# Patient Record
Sex: FEMALE | Race: WHITE | NOT HISPANIC OR LATINO | URBAN - METROPOLITAN AREA
[De-identification: names, ages, dates, MRNs, and addresses within clinical notes are randomized per-mention and may not be internally consistent; named-entity substitution may affect disease eponyms.]

---

## 2023-03-17 LAB
EXTERNAL HIV SCREEN: NORMAL
HCV AB SER-ACNC: NEGATIVE

## 2023-07-17 ENCOUNTER — TELEPHONE (OUTPATIENT)
Dept: OBGYN CLINIC | Facility: OTHER | Age: 53
End: 2023-07-17

## 2023-07-17 ENCOUNTER — OFFICE VISIT (OUTPATIENT)
Age: 53
End: 2023-07-17
Payer: COMMERCIAL

## 2023-07-17 VITALS
WEIGHT: 129 LBS | HEIGHT: 63 IN | OXYGEN SATURATION: 98 % | SYSTOLIC BLOOD PRESSURE: 126 MMHG | RESPIRATION RATE: 16 BRPM | BODY MASS INDEX: 22.86 KG/M2 | DIASTOLIC BLOOD PRESSURE: 70 MMHG | TEMPERATURE: 96.8 F | HEART RATE: 85 BPM

## 2023-07-17 DIAGNOSIS — M65.321 TRIGGER INDEX FINGER OF RIGHT HAND: ICD-10-CM

## 2023-07-17 DIAGNOSIS — M19.049 HAND ARTHRITIS: Primary | ICD-10-CM

## 2023-07-17 PROCEDURE — G0382 LEV 3 HOSP TYPE B ED VISIT: HCPCS | Performed by: PHYSICIAN ASSISTANT

## 2023-07-17 PROCEDURE — 99283 EMERGENCY DEPT VISIT LOW MDM: CPT | Performed by: PHYSICIAN ASSISTANT

## 2023-07-17 RX ORDER — NAPROXEN 500 MG/1
500 TABLET ORAL 2 TIMES DAILY WITH MEALS
Qty: 60 TABLET | Refills: 0 | Status: SHIPPED | OUTPATIENT
Start: 2023-07-17

## 2023-07-17 RX ORDER — GABAPENTIN 100 MG/1
100 CAPSULE ORAL 3 TIMES DAILY
COMMUNITY
Start: 2023-03-06

## 2023-07-17 NOTE — TELEPHONE ENCOUNTER
Patient is being referred to a orthopedics. Please schedule accordingly.     [address] [employer]   [phone]

## 2023-07-17 NOTE — PROGRESS NOTES
[facility]        NAME: Vonnie Aggarwal is a 47 y.o. female  : 1970    MRN: [id number]  DATE: 2023  TIME: 10:14 AM    Assessment and Plan   Hand arthritis [M19.049]  1. Hand arthritis  Ambulatory Referral to Orthopedic Surgery    Ambulatory Referral to Physical Therapy    naproxen (EC NAPROSYN) 500 MG EC tablet      2. Trigger index finger of right hand  Ambulatory Referral to Orthopedic Surgery    Ambulatory Referral to Physical Therapy    naproxen (EC NAPROSYN) 500 MG EC tablet            Patient Instructions       Follow up with PCP in 3-5 days. Proceed to  ER if symptoms worsen. Chief Complaint     Chief Complaint   Patient presents with   • Hand Pain     Patient stated that she is unable to move her fingers. Stated that it started about 3 months ago. Finger zoey currently swollen and hurts. History of Present Illness       41-year-old female is presenting today with complaint of hand pain along the DIP joints and and right CMC joints, bilateral several months. The patient uses NSAIDs to help alleviate the pain on occasion. Pain is worse in the morning with stiffness which slightly improves by the afternoon. Denies trauma injury or surgery of the hands. She is also reports that her right index finger locks on occasion and pulls to open the finger on occasional mornings. Review of Systems   Review of Systems   Constitutional: Negative for activity change, appetite change, fatigue and fever. HENT: Negative for sore throat. Respiratory: Negative for cough. Gastrointestinal: Negative for nausea. Musculoskeletal: Positive for arthralgias and joint swelling. Negative for myalgias, neck pain and neck stiffness. Skin: Negative for color change. Neurological: Negative for dizziness, numbness and headaches.          Current Medications       Current Outpatient Medications:   •  gabapentin (NEURONTIN) 100 mg capsule, Take 100 mg by mouth Three times a day, Disp: , Rfl:   •  naproxen (EC NAPROSYN) 500 MG EC tablet, Take 1 tablet (500 mg total) by mouth 2 (two) times a day with meals, Disp: 60 tablet, Rfl: 0    Current Allergies     Allergies as of 07/17/2023 - Reviewed 07/17/2023   Allergen Reaction Noted   • Ciprofloxacin Other (See Comments) 09/04/2015   • Fluvastatin Throat Swelling 09/17/2020            The following portions of the patient's history were reviewed and updated as appropriate: allergies, current medications, past family history, past medical history, past social history, past surgical history and problem list.     History reviewed. No pertinent past medical history. History reviewed. No pertinent surgical history. History reviewed. No pertinent family history. Medications have been verified. Objective   /70   Pulse 85   Temp (!) 96.8 °F (36 °C)   Resp 16   Ht 5' 3" (1.6 m)   Wt 58.5 kg (129 lb)   SpO2 98%   BMI 22.85 kg/m²        Physical Exam     Physical Exam  Vitals and nursing note reviewed. Constitutional:       General: She is not in acute distress. Appearance: Normal appearance. She is not ill-appearing. Eyes:      Extraocular Movements: Extraocular movements intact. Conjunctiva/sclera: Conjunctivae normal.      Pupils: Pupils are equal, round, and reactive to light. Cardiovascular:      Rate and Rhythm: Normal rate and regular rhythm. Pulses: Normal pulses. Pulmonary:      Effort: Pulmonary effort is normal. No respiratory distress. Musculoskeletal:         General: Tenderness and deformity (Heberben's nodes) present. No swelling. Cervical back: Normal range of motion and neck supple. Skin:     General: Skin is warm. Findings: No bruising, erythema or lesion. Neurological:      General: No focal deficit present. Mental Status: She is alert and oriented to person, place, and time.       Coordination: Coordination normal.      Gait: Gait normal.   Psychiatric:         Mood and Affect: Mood normal.         Behavior: Behavior normal.         Thought Content:  Thought content normal.         Judgment: Judgment normal.

## 2023-07-17 NOTE — PATIENT INSTRUCTIONS
Trigger Finger   WHAT YOU NEED TO KNOW:   Trigger finger is when your finger or thumb gets stuck in a bent position and snaps, pops, or clicks when you straighten it. DISCHARGE INSTRUCTIONS:   Medicines:   NSAIDs:  These medicines decrease pain and swelling. NSAIDs can be bought without a doctor's order. Ask which medicine is right for you and how much to take. Take as directed. NSAIDs can cause stomach bleeding or kidney problems if not taken correctly. Take your medicine as directed. Contact your healthcare provider if you think your medicine is not helping or if you have side effects. Tell your provider if you are allergic to any medicine. Keep a list of the medicines, vitamins, and herbs you take. Include the amounts, and when and why you take them. Bring the list or the pill bottles to follow-up visits. Carry your medicine list with you in case of an emergency. Follow up with your healthcare provider or hand specialist as directed:  Write down your questions so you remember to ask them during your visits. Physical therapy  is used to teach you exercises to help improve movement and strength, and to decrease pain. Splint:  You may need to wear a splint for up to 6 weeks to keep your finger straight. This will help your finger joints rest and prevent you from bending your finger while you sleep. Contact your healthcare provider or hand specialist if:   Your symptoms do not go away or they return, even after treatment. The pain, swelling, or stiffness interferes with your daily activities. You have more trouble moving your finger. Your finger is tingling. You have questions or concerns about your condition or care. Return to the emergency department if:   You cannot move your finger at all. Your finger is numb. © Copyright [employer] 2022 Information is for End User's use only and may not be sold, redistributed or otherwise used for commercial purposes.   The above information is an  only. It is not intended as medical advice for individual conditions or treatments. Talk to your doctor, nurse or pharmacist before following any medical regimen to see if it is safe and effective for you. Arthritis   AMBULATORY CARE:   Arthritis  is pain or disease in one or more joints. There are many types of arthritis. Types such as rheumatoid arthritis cause inflammation in the joints. Other types wear away the cartilage between joints, such as osteoarthritis. This makes the bones of the joint rub together when you move the joint. An infection from bacteria, a virus, or a fungus can also cause arthritis. Your symptoms may be constant, or symptoms may come and go. Arthritis often gets worse over time and can cause permanent joint damage. Common signs and symptoms of arthritis:   Pain, swelling, or stiffness in the joint    Limited range of motion in the joint    Warmth or redness over the joint    Tenderness when you touch the joint    Stiff joints in the morning that loosen with movement    A creaking or grinding sound when you move the joint    Fever    Call your doctor or rheumatologist if:   You have a fever and severe joint pain or swelling. You cannot move the affected joint. You have severe joint pain you cannot tolerate. You have a new or worsening rash. Your pain or swelling does not get better with treatment. You have questions or concerns about your condition or care. Treatment  will depend on the type of arthritis you have and if it is severe. You may need any of the following:  Acetaminophen  decreases pain and fever. It is available without a doctor's order. Ask how much to take and how often to take it. Follow directions. Read the labels of all other medicines you are using to see if they also contain acetaminophen, or ask your doctor or pharmacist. Acetaminophen can cause liver damage if not taken correctly.     NSAIDs , such as ibuprofen, help decrease swelling, pain, and fever. This medicine is available with or without a doctor's order. NSAIDs can cause stomach bleeding or kidney problems in certain people. If you take blood thinner medicine, always ask your healthcare provider if NSAIDs are safe for you. Always read the medicine label and follow directions. Steroid medicine  helps reduce swelling and pain. Prescription pain medicine  may be given. Ask your healthcare provider how to take this medicine safely. Some prescription pain medicines contain acetaminophen. Do not take other medicines that contain acetaminophen without talking to your healthcare provider. Too much acetaminophen may cause liver damage. Prescription pain medicine may cause constipation. Ask your healthcare provider how to prevent or treat constipation. Surgery  may be needed to repair or replace a damaged joint. Manage your symptoms:   Rest your painful joint so it can heal.  Your healthcare provider may recommend crutches or a walker if the affected joint is in a leg. Apply ice or heat to the joint. Both can help decrease swelling and pain. Ice may also help prevent tissue damage. Use an ice pack, or put crushed ice in a plastic bag. Cover it with a towel and place it on your joint for 15 to 20 minutes every hour or as directed. You can apply heat for 20 minutes every 2 hours. Heat treatment includes hot packs or heat lamps. Elevate your joint. Elevation helps reduce swelling and pain. Raise your joint above the level of your heart as often as you can. Prop your painful joint on pillows to keep it above your heart comfortably. Manage arthritis:   Talk to your healthcare providers about your arthritis medicines. Some medicines may only be needed when you have arthritis pain. You may need to take other medicines every day to prevent arthritis from getting worse. Your healthcare providers will help you understand all your medicines and when to take them.  It is important to take the medicines as directed, even if you start to feel better. You can continue to have joint damage and inflammation even if you do not feel it. Eat a variety of healthy foods. Healthy foods include fruits, vegetables, whole-grain breads, low-fat dairy products, beans, lean meats, and fish. Ask if you need to be on a special diet. A diet rich in calcium and vitamin D may decrease your risk of osteoporosis. Foods high in calcium include milk, cheese, broccoli, and tofu. Vitamin D may be found in meat, fish, fortified milk, cereal and bread. Ask if you need calcium or vitamin D supplements. Go to physical or occupational therapy as directed. A physical therapist can teach you exercises to improve flexibility and range of motion. You may also be shown non-weight-bearing exercises that are safe for your joints, such as swimming. Exercise can help keep your joints flexible and reduce pain. An occupational therapist can help you learn to do your daily activities when your joints are stiff or sore. Maintain a healthy weight. Extra weight puts increased pressure on your joints. Ask your healthcare provider what you should weigh. If you need to lose weight, he or she can help you create a weight loss program. Weight loss can help reduce pain and increase your ability to do your activities. Wear flat or low-heeled shoes. This will help decrease pain and reduce pressure on your ankle, knee, and hip joints. Do not smoke. Nicotine and other chemicals in cigarettes and cigars can damage your bones and joints. Ask your healthcare provider for information if you currently smoke and need help to quit. E-cigarettes or smokeless tobacco still contain nicotine. Talk to your healthcare provider before you use these products. Support devices:   Orthotic shoes or insoles  help support your feet when you walk. Crutches, a cane, or a walker  may help decrease your risk for falling.  They also decrease stress on affected joints. Devices to prevent falls  include raised toilet seats and bathtub bars to help you get up from sitting. Handrails can be placed in areas where you need balance and support. Devices to help with support and rest  include splints to wear on your hands and a firm pillow while you sleep. Use a pillow that is firm enough to support your neck and head. Follow up with your healthcare provider or rheumatologist as directed:  Write down your questions so you remember to ask them during your visits. © Copyright [facility] 2022 Information is for End User's use only and may not be sold, redistributed or otherwise used for commercial purposes. The above information is an  only. It is not intended as medical advice for individual conditions or treatments. Talk to your doctor, nurse or pharmacist before following any medical regimen to see if it is safe and effective for you.

## 2023-12-08 ENCOUNTER — APPOINTMENT (OUTPATIENT)
Age: 53
End: 2023-12-08
Payer: COMMERCIAL

## 2023-12-08 ENCOUNTER — OFFICE VISIT (OUTPATIENT)
Age: 53
End: 2023-12-08
Payer: COMMERCIAL

## 2023-12-08 VITALS
RESPIRATION RATE: 18 BRPM | WEIGHT: 137 LBS | OXYGEN SATURATION: 98 % | SYSTOLIC BLOOD PRESSURE: 122 MMHG | BODY MASS INDEX: 24.27 KG/M2 | HEIGHT: 63 IN | HEART RATE: 89 BPM | TEMPERATURE: 97.9 F | DIASTOLIC BLOOD PRESSURE: 81 MMHG

## 2023-12-08 DIAGNOSIS — M25.532 LEFT WRIST PAIN: ICD-10-CM

## 2023-12-08 DIAGNOSIS — M77.8 LEFT WRIST TENDONITIS: Primary | ICD-10-CM

## 2023-12-08 PROBLEM — M54.12 RADICULOPATHY, CERVICAL REGION: Status: ACTIVE | Noted: 2023-03-06

## 2023-12-08 PROBLEM — G56.03 BILATERAL CARPAL TUNNEL SYNDROME: Status: ACTIVE | Noted: 2023-03-06

## 2023-12-08 PROBLEM — K21.9 GASTROESOPHAGEAL REFLUX DISEASE WITHOUT ESOPHAGITIS: Status: ACTIVE | Noted: 2023-03-06

## 2023-12-08 PROBLEM — M51.369 DISC DEGENERATION, LUMBAR: Status: ACTIVE | Noted: 2020-09-18

## 2023-12-08 PROBLEM — N87.9 CERVICAL DYSPLASIA: Status: ACTIVE | Noted: 2018-10-12

## 2023-12-08 PROBLEM — F43.23 ADJUSTMENT REACTION WITH ANXIETY AND DEPRESSION: Status: ACTIVE | Noted: 2023-03-06

## 2023-12-08 PROBLEM — D25.0 SUBMUCOUS LEIOMYOMA OF UTERUS: Status: ACTIVE | Noted: 2018-11-13

## 2023-12-08 PROBLEM — M51.36 DISC DEGENERATION, LUMBAR: Status: ACTIVE | Noted: 2020-09-18

## 2023-12-08 PROBLEM — M43.17 SPONDYLOLISTHESIS OF LUMBOSACRAL REGION: Status: ACTIVE | Noted: 2020-09-29

## 2023-12-08 PROBLEM — J30.1 SEASONAL ALLERGIC RHINITIS DUE TO POLLEN: Status: ACTIVE | Noted: 2023-03-06

## 2023-12-08 PROBLEM — M54.50 LOWER BACK PAIN: Status: ACTIVE | Noted: 2020-09-18

## 2023-12-08 PROBLEM — Z87.410 HISTORY OF CERVICAL DYSPLASIA: Status: ACTIVE | Noted: 2020-12-08

## 2023-12-08 PROBLEM — M54.16 LUMBAR RADICULOPATHY: Status: ACTIVE | Noted: 2020-09-18

## 2023-12-08 PROCEDURE — G0382 LEV 3 HOSP TYPE B ED VISIT: HCPCS

## 2023-12-08 PROCEDURE — 73110 X-RAY EXAM OF WRIST: CPT

## 2023-12-08 PROCEDURE — 29125 APPL SHORT ARM SPLINT STATIC: CPT

## 2023-12-08 PROCEDURE — 99283 EMERGENCY DEPT VISIT LOW MDM: CPT

## 2023-12-08 RX ORDER — NAPROXEN 500 MG/1
500 TABLET ORAL 2 TIMES DAILY WITH MEALS
Qty: 14 TABLET | Refills: 0 | Status: SHIPPED | OUTPATIENT
Start: 2023-12-08 | End: 2023-12-15

## 2023-12-08 RX ORDER — FAMOTIDINE 20 MG/1
20 TABLET, FILM COATED ORAL 2 TIMES DAILY
COMMUNITY
Start: 2023-03-06 | End: 2024-03-05

## 2023-12-08 RX ORDER — FLUTICASONE PROPIONATE 50 MCG
SPRAY, SUSPENSION (ML) NASAL
COMMUNITY
Start: 2023-11-03

## 2023-12-08 NOTE — PROGRESS NOTES
[facility]        NAME: Erwin Davey is a 47 y.o. female  : 1970    MRN: [id number]  DATE: 2023  TIME: 12:30 PM    Assessment and Plan   Left wrist tendonitis [M77.8]  1. Left wrist tendonitis  naproxen (Naprosyn) 500 mg tablet    Ambulatory Referral to Orthopedic Surgery      2. Left wrist pain  XR wrist 3+ vw left        No acute fracture per provider read, will follow-up with final read by radiologist if findings are significant. Suspect tendonitis given clinical presentation. Wrist placed in pre-fabricated brace and naproxen as directed for pain. Offered patient physical therapy but patient declined. Referral placed to orthopedics for further management. Patient Instructions     Wear wrist brace during the day while active. Rest and elevate wrist often, applying heat/ice every 3-4 hours for 20 minutes at a time. Take naproxen as directed for next week. May take tylenol every 4-6 hours as needed for additional relief of symptoms. Follow-up with orthopedics if no improvement of symptoms after 1 week. Report to the ER if symptoms worsen. Chief Complaint     Chief Complaint   Patient presents with    Wrist Pain     Started 2 months ago, patient complains of pain and swelling of left wrist and thumb. History of Present Illness       47year old female presents for evaluation of left wrist pain that started 2 months ago. She denies any known injury but relates she works as a  and uses her wrist to carry trays. She also reports a history of trigger finger in her right hand that she was seen for in July that seems to be getting worse. She was prescribed Naproxen and referred to orthopedics at that time but she reports she's been busy and has not been able to see orthopedics. She reports the pain is constant and felt there was a lump present around her wrist. She has been taking Advil for pain with minimal improvement.     Arm Pain   The incident occurred more than 1 week ago. The incident occurred at home. The injury mechanism was repetitive motion. The pain is present in the left wrist. The quality of the pain is described as aching. The pain does not radiate. The pain is at a severity of 7/10. The pain is moderate. The pain has been Intermittent since the incident. Pertinent negatives include no chest pain, muscle weakness, numbness or tingling. The symptoms are aggravated by palpation and movement. She has tried ice, immobilization and NSAIDs for the symptoms. The treatment provided no relief. Review of Systems   Review of Systems   Constitutional:  Negative for activity change, appetite change, chills, fatigue and fever. Respiratory:  Negative for chest tightness and shortness of breath. Cardiovascular:  Negative for chest pain. Gastrointestinal:  Negative for abdominal pain. Musculoskeletal:  Negative for back pain and neck pain. Skin:  Negative for color change, pallor, rash and wound. Neurological:  Negative for dizziness, tingling, light-headedness, numbness and headaches.          Current Medications       Current Outpatient Medications:     fluticasone (FLONASE) 50 mcg/act nasal spray, SPRAY 2 SPRAYS INTO EACH NOSTRIL EVERY DAY, Disp: , Rfl:     gabapentin (NEURONTIN) 100 mg capsule, Take 100 mg by mouth Three times a day, Disp: , Rfl:     naproxen (Naprosyn) 500 mg tablet, Take 1 tablet (500 mg total) by mouth 2 (two) times a day with meals for 7 days, Disp: 14 tablet, Rfl: 0    famotidine (PEPCID) 20 mg tablet, Take 20 mg by mouth 2 (two) times a day (Patient not taking: Reported on 12/8/2023), Disp: , Rfl:     Current Allergies     Allergies as of 12/08/2023 - Reviewed 12/08/2023   Allergen Reaction Noted    Ciprofloxacin Other (See Comments) 09/04/2015    Fluvastatin Throat Swelling 09/17/2020            The following portions of the patient's history were reviewed and updated as appropriate: allergies, current medications, past family history, past medical history, past social history, past surgical history and problem list.     History reviewed. No pertinent past medical history. History reviewed. No pertinent surgical history. History reviewed. No pertinent family history. Medications have been verified. Objective   /81   Pulse 89   Temp 97.9 °F (36.6 °C)   Resp 18   Ht 5' 3" (1.6 m)   Wt 62.1 kg (137 lb)   SpO2 98%   BMI 24.27 kg/m²        Physical Exam     Physical Exam  Vitals and nursing note reviewed. Constitutional:       General: She is awake. Appearance: Normal appearance. She is well-developed and normal weight. Cardiovascular:      Rate and Rhythm: Normal rate and regular rhythm. Pulses: Normal pulses. Radial pulses are 2+ on the right side and 2+ on the left side. Pulmonary:      Effort: Pulmonary effort is normal.   Musculoskeletal:         General: Swelling, tenderness and signs of injury present. No deformity. Right wrist: Normal.      Left wrist: Swelling, tenderness and bony tenderness present. No deformity, effusion, lacerations, snuff box tenderness or crepitus. Decreased range of motion. Normal pulse. Arms:       Cervical back: Normal range of motion and neck supple. Skin:     General: Skin is warm and dry. Capillary Refill: Capillary refill takes less than 2 seconds. Findings: No abrasion, bruising, ecchymosis, rash or wound. Neurological:      General: No focal deficit present. Mental Status: She is alert. Psychiatric:         Mood and Affect: Mood normal.         Behavior: Behavior normal. Behavior is cooperative. Judgment: Judgment normal.       Orthopedic injury treatment    Date/Time: 12/8/2023 12:00 PM    Performed by: Darren Flowers [address] [place] [address] by: STUART Beavers    Patient Location:  Bedside  [address] Protocol:  Procedure performed by: (Michelle DUPREE MA)  Consent: Verbal consent obtained.   Risks and benefits: risks, benefits and alternatives were discussed  Consent given by: patient  Patient understanding: patient states understanding of the procedure being performed  Patient consent: the patient's understanding of the procedure matches consent given  Required items: required blood products, implants, devices, and special equipment available  Patient identity confirmed: verbally with patient    Injury location:  Wrist  Location details:  Left wrist  Injury type: Soft tissue  Neurovascular status: Neurovascularly intact    Distal perfusion: normal    Neurological function: normal    Range of motion: reduced    Local anesthesia used?: No    General anesthesia used?: No    Skeletal traction used?: No    Immobilization:  Brace  Splint type:  Wrist  Supplies used: pre-fabricated brace.   Neurovascular status: Neurovascularly intact    Distal perfusion: normal    Neurological function: normal    Range of motion: normal    Patient tolerance:  Patient tolerated the procedure well with no immediate complications

## 2023-12-08 NOTE — PATIENT INSTRUCTIONS
Health Maintenance Due   Topic Date Due   â¢ Hepatitis B Vaccine (1 of 3 - Risk 3-dose series) 12/29/1962   â¢ Medicare Wellness 65+  09/24/2020   â¢ Depression Screening  10/20/2020       Patient is due for topics as listed above but is not proceeding with Immunization(s) Hep B at this time. Wear wrist brace during the day while active. Rest and elevate wrist often, applying heat/ice every 3-4 hours for 20 minutes at a time. Take naproxen as directed for next week. May take tylenol every 4-6 hours as needed for additional relief of symptoms. Follow-up with orthopedics if no improvement of symptoms after 1 week. Report to the ER if symptoms worsen.

## 2024-01-23 ENCOUNTER — OFFICE VISIT (OUTPATIENT)
Dept: OBGYN CLINIC | Facility: HOSPITAL | Age: 54
End: 2024-01-23
Payer: COMMERCIAL

## 2024-01-23 VITALS
HEART RATE: 67 BPM | DIASTOLIC BLOOD PRESSURE: 81 MMHG | WEIGHT: 137.8 LBS | SYSTOLIC BLOOD PRESSURE: 127 MMHG | BODY MASS INDEX: 24.41 KG/M2 | HEIGHT: 63 IN

## 2024-01-23 DIAGNOSIS — M54.2 CERVICAL PAIN (NECK): Primary | ICD-10-CM

## 2024-01-23 DIAGNOSIS — M65.4 TENOSYNOVITIS, DE QUERVAIN: ICD-10-CM

## 2024-01-23 PROCEDURE — 20550 NJX 1 TENDON SHEATH/LIGAMENT: CPT | Performed by: ORTHOPAEDIC SURGERY

## 2024-01-23 PROCEDURE — 99204 OFFICE O/P NEW MOD 45 MIN: CPT | Performed by: ORTHOPAEDIC SURGERY

## 2024-01-23 RX ORDER — BETAMETHASONE SODIUM PHOSPHATE AND BETAMETHASONE ACETATE 3; 3 MG/ML; MG/ML
6 INJECTION, SUSPENSION INTRA-ARTICULAR; INTRALESIONAL; INTRAMUSCULAR; SOFT TISSUE
Status: COMPLETED | OUTPATIENT
Start: 2024-01-23 | End: 2024-01-23

## 2024-01-23 RX ORDER — LIDOCAINE HYDROCHLORIDE 20 MG/ML
1 INJECTION, SOLUTION EPIDURAL; INFILTRATION; INTRACAUDAL; PERINEURAL
Status: COMPLETED | OUTPATIENT
Start: 2024-01-23 | End: 2024-01-23

## 2024-01-23 RX ADMIN — BETAMETHASONE SODIUM PHOSPHATE AND BETAMETHASONE ACETATE 6 MG: 3; 3 INJECTION, SUSPENSION INTRA-ARTICULAR; INTRALESIONAL; INTRAMUSCULAR; SOFT TISSUE at 11:00

## 2024-01-23 RX ADMIN — LIDOCAINE HYDROCHLORIDE 1 ML: 20 INJECTION, SOLUTION EPIDURAL; INFILTRATION; INTRACAUDAL; PERINEURAL at 11:00

## 2024-01-23 NOTE — PROGRESS NOTES
Assessment:  1. Cervical pain (neck)  Ambulatory Referral to Chiropractic      2. Tenosynovitis, de Quervain, left  Ambulatory Referral to Orthopedic Surgery    Ambulatory Referral to PT/OT Hand Therapy    Thumb Cude comf/Cool    Ambulatory Referral to Hand Surgery          Plan:  The patient has an examination consistent with deQuervain's tenosynovitis of the left wrist.  I have discussed with the patient the pathophysiology of this diagnosis and reviewed how the examination correlates with this diagnosis.  Treatment options were discussed at length and after discussing these treatment options, the patient was provided with a comfort cool brace today and a referral to hand therapy.  I would like the patient seen by one of our hand specialist in 4 weeks.  Referral placed for Chiro for evaluation of the cervical spine.         The above stated was discussed in layman's terms and the patient expressed understanding.  All questions were answered to the patient's satisfaction.             Subjective:   Luke Garcia is a 53 y.o. female who presents today for evaluation of left wrist pain. The pain began a few month(s) ago and is not associated with an acute injury. The patient describes the pain as aching and dull in intensity,  intermittent in timing, and localizes the pain to the  left radial wrist.  The pain is worse with overuse and relieved by rest.  The pain is not associated with numbness and tingling.  The pain is not associated with constitutional symptoms. The patient is not awoken at night by the pain.    The patient also reports cervical pain today no injury         History reviewed. No pertinent past medical history.    History reviewed. No pertinent surgical history.    History reviewed. No pertinent family history.    Social History     Occupational History    Not on file   Tobacco Use    Smoking status: Unknown    Smokeless tobacco: Not on file   Substance and Sexual Activity    Alcohol use: Not on file     Drug use: Not on file    Sexual activity: Not on file         Current Outpatient Medications:     fluticasone (FLONASE) 50 mcg/act nasal spray, SPRAY 2 SPRAYS INTO EACH NOSTRIL EVERY DAY, Disp: , Rfl:     gabapentin (NEURONTIN) 100 mg capsule, Take 100 mg by mouth Three times a day, Disp: , Rfl:     famotidine (PEPCID) 20 mg tablet, Take 20 mg by mouth 2 (two) times a day (Patient not taking: Reported on 12/8/2023), Disp: , Rfl:     naproxen (Naprosyn) 500 mg tablet, Take 1 tablet (500 mg total) by mouth 2 (two) times a day with meals for 7 days, Disp: 14 tablet, Rfl: 0    Allergies   Allergen Reactions    Ciprofloxacin Other (See Comments)    Fluvastatin Throat Swelling         Objective:  Vitals:    01/23/24 1157   BP: 127/81   Pulse: 67       Review of Systems   Constitutional:  Negative for chills and fever.   HENT:  Negative for drooling and sneezing.    Eyes:  Negative for redness.   Respiratory:  Negative for cough and wheezing.    Gastrointestinal:  Negative for nausea and vomiting.   Musculoskeletal:         Please see ortho exam   Psychiatric/Behavioral:  Negative for behavioral problems. The patient is not nervous/anxious.        De Quervain's Tenosynovitis Exam:  left side    Positive tender to palpation over 1st dorsal extensor compartment   Positive palpable nodule  Positive crepitus over 1st dorsal extensor compartment   Positive Finkelstein's test    Positive pain with resisted abduction of the thumb     Physical Exam  Vitals reviewed.   Constitutional:       Appearance: She is well-developed.   Eyes:      Pupils: Pupils are equal, round, and reactive to light.   Pulmonary:      Effort: Pulmonary effort is normal.      Breath sounds: Normal breath sounds.   Abdominal:      General: Abdomen is flat. There is no distension.   Skin:     General: Skin is warm and dry.   Neurological:      Mental Status: She is alert and oriented to person, place, and time.   Psychiatric:         Behavior: Behavior  "normal.         Thought Content: Thought content normal.         Judgment: Judgment normal.           Diagnostics, reviewed and taken today if performed as documented:    The attending physician has personally reviewed the pertinent films in PACS and interpretation is as follows:  Left wrist x-rays demonstrates no fracture or dislocation    Procedures, if performed today:    Hand/upper extremity injection: L extensor compartment 1  Universal Protocol:  Consent: Verbal consent obtained.  Consent given by: patient  Patient understanding: patient states understanding of the procedure being performed  Site marked: the operative site was marked  Patient identity confirmed: verbally with patient  Supporting Documentation  Indications: pain   Procedure Details  Condition:de Quervain's tenosynovitis Site: L extensor compartment 1   Needle size: 25 G  Ultrasound guidance: no  Approach: radial  Medications administered: 6 mg betamethasone acetate-betamethasone sodium phosphate 6 (3-3) mg/mL; 1 mL lidocaine (PF) 2 %  Patient tolerance: patient tolerated the procedure well with no immediate complications  Dressing:  Sterile dressing applied               Scribe Attestation      I,:  Nikki Wright am acting as a scribe while in the presence of the attending physician.:       I,:  Carlos Plummer MD personally performed the services described in this documentation    as scribed in my presence.:                 Portions of the record may have been created with voice recognition software.  Occasional wrong word or \"sound a like\" substitutions may have occurred due to the inherent limitations of voice recognition software.  Read the chart carefully and recognize, using context, where substitutions have occurred.  "

## 2024-02-06 ENCOUNTER — OFFICE VISIT (OUTPATIENT)
Age: 54
End: 2024-02-06
Payer: COMMERCIAL

## 2024-02-06 VITALS
HEART RATE: 84 BPM | DIASTOLIC BLOOD PRESSURE: 74 MMHG | BODY MASS INDEX: 24.27 KG/M2 | SYSTOLIC BLOOD PRESSURE: 132 MMHG | WEIGHT: 137 LBS | OXYGEN SATURATION: 97 % | HEIGHT: 63 IN

## 2024-02-06 DIAGNOSIS — S16.1XXA CERVICAL STRAIN, ACUTE, INITIAL ENCOUNTER: ICD-10-CM

## 2024-02-06 DIAGNOSIS — G44.219 EPISODIC TENSION-TYPE HEADACHE, NOT INTRACTABLE: ICD-10-CM

## 2024-02-06 DIAGNOSIS — M99.02 SEGMENTAL DYSFUNCTION OF THORACIC REGION: Primary | ICD-10-CM

## 2024-02-06 PROCEDURE — 98940 CHIROPRACT MANJ 1-2 REGIONS: CPT | Performed by: CHIROPRACTOR

## 2024-02-06 PROCEDURE — 99203 OFFICE O/P NEW LOW 30 MIN: CPT | Performed by: CHIROPRACTOR

## 2024-02-06 PROCEDURE — 97110 THERAPEUTIC EXERCISES: CPT | Performed by: CHIROPRACTOR

## 2024-02-06 NOTE — PROGRESS NOTES
Initial date of service: 2/6/24    Diagnoses and all orders for this visit:    Segmental dysfunction of thoracic region    Cervical strain, acute, initial encounter  -     Ambulatory Referral to Chiropractic  -     Ambulatory referral to Spine & Pain Management; Future    Episodic tension-type headache, not intractable    No red flags, radiculopathy or neurologic deficit appreciated clinically. Pt's symptoms and exam findings consistent with mechanical neck/ubp secondary to repetitive st/sp injury of L lev scapula mm, exacerbated by postural/ergonomic stressors. Pt responded well to traction, stretches and manual mobilization of the affected spinal and myofascial jt dysfunction, with increased ROM; trial of conservative tx recommended consisting of stretching, ther-ex, graded mobilization/manipulation of the affected spinal/myofascial tissues, postural/ergonomic education, and take home stretches/exercises. If symptoms fail to improve with short trial of conservative care, appropriate imaging and referral will be coordinated.  Due to location of our offices being outside of her immediate radius as well as pain being present for near 3 months when pain transitions to chronic, pt has been referred to Spine and Pain for trigger point injection consult  Spent greater than 30 min c pt discussing hx, pe, ddx, tx options and reviewing notes/imaging    TREATMENT: 16614,79529  Fear avoidance behavior discussion, encouraged and reassured pt that natural course of condition is to improve over time with adherence to tx plan and home care strategies. Home care recommendations: avoid bed rest, walk (but avoid trails and uneven surfaces), gradual return to activity to tolerance (avoid anything that peripheralizes symptoms), ice 20 min on/off, watch for ice burn, call if symptoms peripheralize, worsen, or neurologic deficit progresses. Ther-ex: IASTM - discussed post procedure soreness and/or ecchymosis for up to 36 hrs, applied to  affected mm hypertonicities; wall jerel, axial retraction, upper trap stretch, lev scap stretch, SCM stretch, abdominal bracing; greater than 15 min spent performing above mentioned ther-ex to improve ROM/flexibility. Thoracic mobilization/manipulation: prone P-A mob, supine A-P manip; cervical mobilization/manipulation: traction, diversified supine graded mobilization    HPI:  Luke Garcia is a 53 y.o. female   Chief Complaint   Patient presents with    Neck Pain     Neck pain-9    Shoulder Pain     Left shoulder pain-9     Pt presents for eval and tx for left sided neck and ubp pain since shoveling snow winter 2024. Pt has tried massage and cupping with modest benefit. Pt waits tables and cleans houses. Pt has concurrent tendonitis in L wrist that has responded well to cortisone injection    Neck Pain   This is a new problem. The current episode started more than 1 month ago. The problem occurs daily. The problem has been waxing and waning. The pain is present in the left side, midline and occipital region. The quality of the pain is described as aching, burning, cramping and stabbing. Pain scale: 7-10/10. The symptoms are aggravated by bending, position, stress and twisting (palliative includes massage). Associated symptoms include headaches. Associated symptoms comments: suboccipital.   Shoulder Pain       History reviewed. No pertinent past medical history.   The following portions of the patient's history were reviewed and updated as appropriate: allergies, past family history, past medical history, past social history, past surgical history, and problem list.  Review of Systems   Musculoskeletal:  Positive for neck pain.   Neurological:  Positive for headaches.     Physical Exam  Eyes:      Extraocular Movements: Extraocular movements intact.   Neck:        Comments: Pnful and limited in [place], Lrot, Llf, Ext/Lrot  Cardiovascular:      Pulses: Normal pulses.   Musculoskeletal:      Cervical back: Pain with  movement and muscular tenderness present. Decreased range of motion.      Thoracic back: Spasms and tenderness present. Decreased range of motion.   Lymphadenopathy:      Cervical: No cervical adenopathy.   Neurological:      Mental Status: She is alert and oriented to person, place, and time.      Cranial Nerves: Cranial nerves 2-12 are intact.      Sensory: Sensation is intact.      Motor: Motor function is intact.      Coordination: Coordination is intact.      Gait: Gait is intact. Gait and tandem walk normal.      Deep Tendon Reflexes: Reflexes normal. Babinski sign absent on the right side. Babinski sign absent on the left side.      Reflex Scores:       Tricep reflexes are 2+ on the right side and 2+ on the left side.       Bicep reflexes are 2+ on the right side and 2+ on the left side.       Brachioradialis reflexes are 2+ on the right side and 2+ on the left side.  Psychiatric:         Mood and Affect: Mood and affect normal.       SOFT TISSUE ASSESSMENT: Hypertonicity and tenderness palpated L C5-T6 erector spinae, L upper trap, lev scap, SCM, rhomboid, suboccipitals JOINT RECTRICTIONS: C0/1, C5-T6 ORTHO: Rosemary unremarkable for centralization/peripheralization; max foraminal comp elicits local np R/L; shoulder depression elicits stiffness in R/L upper trap; brachial plexus tension test elicits no neural tension in R/L UE; cervical distraction relieves CC    Return in about 1 week (around 2/13/2024) for Next scheduled follow up.

## 2024-02-06 NOTE — Clinical Note
Desi/s neck pain did well with a focus on myofascial tx today and her wrist pain/swelling was resolved from the injection by the time I saw her; thanks!

## 2024-02-12 ENCOUNTER — PROCEDURE VISIT (OUTPATIENT)
Age: 54
End: 2024-02-12
Payer: COMMERCIAL

## 2024-02-12 VITALS
HEIGHT: 63 IN | HEART RATE: 79 BPM | BODY MASS INDEX: 24.27 KG/M2 | SYSTOLIC BLOOD PRESSURE: 153 MMHG | DIASTOLIC BLOOD PRESSURE: 84 MMHG | WEIGHT: 137 LBS

## 2024-02-12 DIAGNOSIS — M99.02 SEGMENTAL DYSFUNCTION OF THORACIC REGION: ICD-10-CM

## 2024-02-12 DIAGNOSIS — G44.219 EPISODIC TENSION-TYPE HEADACHE, NOT INTRACTABLE: ICD-10-CM

## 2024-02-12 DIAGNOSIS — S16.1XXA CERVICAL STRAIN, ACUTE, INITIAL ENCOUNTER: Primary | ICD-10-CM

## 2024-02-12 PROCEDURE — 98940 CHIROPRACT MANJ 1-2 REGIONS: CPT | Performed by: CHIROPRACTOR

## 2024-02-12 PROCEDURE — 97110 THERAPEUTIC EXERCISES: CPT | Performed by: CHIROPRACTOR

## 2024-02-13 NOTE — PROGRESS NOTES
Initial date of service: 2/6/24    Diagnoses and all orders for this visit:    Cervical strain, acute, initial encounter    Segmental dysfunction of thoracic region    Episodic tension-type headache, not intractable    Pt improved with reduced pain and increased ROM    TREATMENT: [id number]  Ther-ex: IASTM - discussed post procedure soreness and/or ecchymosis for up to 36 hrs, applied to affected mm hypertonicities; wall jerel, axial retraction, upper trap stretch, lev scap stretch, SCM stretch, abdominal bracing; greater than 15 min spent performing above mentioned ther-ex to improve ROM/flexibility. Thoracic mobilization/manipulation: prone P-A mob, supine A-P manip; cervical mobilization/manipulation: traction, diversified supine graded mobilization    HPI:  Luke Garcia is a 53 y.o. female   Chief Complaint   Patient presents with    Neck - Pain     Neck pain that radiates down left shoulder. Patient states felt good for 3 days now back .  Pain score 4        Pt presents for tx for left sided neck and ubp pain since shoveling snow winter 2024. Pt has tried massage and cupping with modest benefit. Pt waits tables and cleans houses. Pt has concurrent tendonitis in L wrist that has responded well to cortisone injection  2/12: Pt reports feeling and moving better since last tx; near resolution for a few days but symptoms flared    Neck Pain   This is a new problem. The current episode started more than 1 month ago. The problem occurs daily. The problem has been waxing and waning. The pain is present in the left side, midline and occipital region. The quality of the pain is described as aching, burning, cramping and stabbing. Pain scale: 1-7/10. The symptoms are aggravated by bending, position, stress and twisting (palliative includes massage). Associated symptoms include headaches. Associated symptoms comments: suboccipital.   Shoulder Pain       History reviewed. No pertinent past medical history.   The following  portions of the patient's history were reviewed and updated as appropriate: allergies, past family history, past medical history, past social history, past surgical history, and problem list.  Review of Systems   Musculoskeletal:  Positive for neck pain.   Neurological:  Positive for headaches.     Physical Exam  Neck:        Comments: Pnful and limited in [place], Lrot, Llf, Ext/Lrot  Musculoskeletal:      Cervical back: Pain with movement and muscular tenderness present. Decreased range of motion.      Thoracic back: Spasms and tenderness present. Decreased range of motion.   Lymphadenopathy:      Cervical: No cervical adenopathy.   Neurological:      Mental Status: She is alert and oriented to person, place, and time.      Gait: Gait is intact.   Psychiatric:         Mood and Affect: Mood and affect normal.       SOFT TISSUE ASSESSMENT: Hypertonicity and tenderness palpated L C5-T6 erector spinae, L upper trap, lev scap, SCM, rhomboid, suboccipitals JOINT RECTRICTIONS: C0/1, C5-T6   Return in about 1 week (around 2/19/2024) for Next scheduled follow up.

## 2024-03-04 ENCOUNTER — OFFICE VISIT (OUTPATIENT)
Age: 54
End: 2024-03-04
Payer: COMMERCIAL

## 2024-03-04 VITALS
BODY MASS INDEX: 24.27 KG/M2 | DIASTOLIC BLOOD PRESSURE: 91 MMHG | WEIGHT: 137 LBS | HEART RATE: 81 BPM | SYSTOLIC BLOOD PRESSURE: 136 MMHG | OXYGEN SATURATION: 100 % | TEMPERATURE: 97.2 F | RESPIRATION RATE: 18 BRPM

## 2024-03-04 DIAGNOSIS — H00.014 HORDEOLUM EXTERNUM OF LEFT UPPER EYELID: Primary | ICD-10-CM

## 2024-03-04 PROCEDURE — 99283 EMERGENCY DEPT VISIT LOW MDM: CPT | Performed by: PHYSICIAN ASSISTANT

## 2024-03-04 PROCEDURE — G0382 LEV 3 HOSP TYPE B ED VISIT: HCPCS | Performed by: PHYSICIAN ASSISTANT

## 2024-03-04 RX ORDER — ERYTHROMYCIN 5 MG/G
0.5 OINTMENT OPHTHALMIC EVERY 8 HOURS SCHEDULED
Qty: 3.5 G | Refills: 0 | Status: SHIPPED | OUTPATIENT
Start: 2024-03-04 | End: 2024-03-14

## 2024-03-04 RX ORDER — CEPHALEXIN 500 MG/1
500 CAPSULE ORAL EVERY 12 HOURS SCHEDULED
Qty: 14 CAPSULE | Refills: 0 | Status: SHIPPED | OUTPATIENT
Start: 2024-03-04 | End: 2024-03-11

## 2024-03-04 NOTE — PROGRESS NOTES
[facility]        NAME: Luke Garcia is a 53 y.o. female  : 1970    MRN: [id number]  DATE: 2024  TIME: 12:22 PM    Assessment and Plan   Hordeolum externum of left upper eyelid [H00.014]  1. Hordeolum externum of left upper eyelid  erythromycin (ILOTYCIN) ophthalmic ointment    cephalexin (KEFLEX) 500 mg capsule            Patient Instructions     Warm compresses locally every 8 hours for approximately 5 to 10 minutes  Erythromycin ophthalmic ointment as directed  Cephalexin 500 mg 1 capsule twice daily for 7 days  Follow-up with ophthalmologist if your symptoms worsen in the next 48 hours.  Proceed to  ER if symptoms worsen.    Chief Complaint     Chief Complaint   Patient presents with    Eye Pain     Pt states her left eye has been swelling over the last 5 days. Pain is radiating to her jawline         History of Present Illness       53-year-old female is presenting today with complaint of 48 to 72 hours for left upper eyelid inflammation swelling and pain.  The patient denies pinkeye, injury, trauma, to the left eye.  The patient does admit having Botox done approximately a week ago over the left eyebrow.  Patient denies discharge of the eye or visual acuity problems.  The patient reports she does not utilize contact lenses.        Review of Systems   Review of Systems   Constitutional:  Negative for activity change, appetite change and fever.   Eyes:  Negative for visual disturbance.   Respiratory:  Negative for cough.    Gastrointestinal:  Negative for abdominal pain, nausea and vomiting.   Musculoskeletal:  Negative for myalgias.   Neurological:  Negative for dizziness, weakness, light-headedness and headaches.         Current Medications       Current Outpatient Medications:     cephalexin (KEFLEX) 500 mg capsule, Take 1 capsule (500 mg total) by mouth every 12 (twelve) hours for 7 days, Disp: 14 capsule, Rfl: 0    erythromycin (ILOTYCIN) ophthalmic ointment, Administer 0.5  inches into the left eye every 8 (eight) hours for 10 days, Disp: 3.5 g, Rfl: 0    fluticasone (FLONASE) 50 mcg/act nasal spray, SPRAY 2 SPRAYS INTO EACH NOSTRIL EVERY DAY, Disp: , Rfl:     gabapentin (NEURONTIN) 100 mg capsule, Take 100 mg by mouth Three times a day, Disp: , Rfl:     famotidine (PEPCID) 20 mg tablet, Take 20 mg by mouth 2 (two) times a day (Patient not taking: Reported on 12/8/2023), Disp: , Rfl:     naproxen (Naprosyn) 500 mg tablet, Take 1 tablet (500 mg total) by mouth 2 (two) times a day with meals for 7 days (Patient not taking: Reported on 2/6/2024), Disp: 14 tablet, Rfl: 0    Current Allergies     Allergies as of 03/04/2024 - Reviewed 03/04/2024   Allergen Reaction Noted    Ciprofloxacin Other (See Comments) 09/04/2015    Fluvastatin Throat Swelling 09/17/2020            The following portions of the patient's history were reviewed and updated as appropriate: allergies, current medications, past family history, past medical history, past social history, past surgical history and problem list.     History reviewed. No pertinent past medical history.    History reviewed. No pertinent surgical history.    History reviewed. No pertinent family history.      Medications have been verified.        Objective   /91   Pulse 81   Temp (!) 97.2 °F (36.2 °C)   Resp 18   Wt 62.1 kg (137 lb)   SpO2 100%   BMI 24.27 kg/m²        Physical Exam     Physical Exam  Vitals and nursing note reviewed.   Constitutional:       General: She is not in acute distress.     Appearance: Normal appearance. She is not ill-appearing, toxic-appearing or diaphoretic.   HENT:      Nose: Nose normal.      Mouth/Throat:      Mouth: Mucous membranes are moist.      Pharynx: Oropharynx is clear.   Eyes:      Extraocular Movements: Extraocular movements intact.      Conjunctiva/sclera: Conjunctivae normal.      Pupils: Pupils are equal, round, and reactive to light.      Comments: Left periorbital region is slightly  swollen with erythema and slight tenderness on palpation.  There is a nodule that is approximately 4 mm localized to the medial aspect of the upper eyelid of the left eye.  There is no purulent discharge.    OD 20/30  OS 20/30  OU 20/30    W/O lenses   Cardiovascular:      Rate and Rhythm: Normal rate and regular rhythm.      Pulses: Normal pulses.   Pulmonary:      Effort: Pulmonary effort is normal. No respiratory distress.      Breath sounds: Normal breath sounds.   Musculoskeletal:         General: Normal range of motion.      Cervical back: Normal range of motion and neck supple.   Skin:     Capillary Refill: Capillary refill takes more than 3 seconds.   Neurological:      Mental Status: She is alert.      Coordination: Coordination normal.      Gait: Gait normal.   Psychiatric:         Mood and Affect: Mood normal.         Behavior: Behavior normal.

## 2024-03-04 NOTE — PATIENT INSTRUCTIONS
Stye   Warm compresses locally every 8 hours for approximately 5 to 10 minutes  Erythromycin ophthalmic ointment as directed  Cephalexin 500 mg 1 capsule twice daily for 7 days  Follow-up with ophthalmologist if your symptoms worsen in the next 48 hours.  Proceed to  ER if symptoms worsen.        WHAT YOU NEED TO KNOW:   A stye is a lump on the edge or inside of your upper or lower eyelid caused by an infection. A stye usually starts to get better within 1 week and is often gone within 2 weeks.       DISCHARGE INSTRUCTIONS:   Call your doctor if:   You have redness and discharge around your eye, and your eye pain is getting worse.    Your vision changes.    The stye has not gone away within 2 weeks.    The stye comes back within a short period of time after treatment.    You have questions or concerns about your condition or care.    Medicines:   Antibiotic medicine  is given as an ointment to put into your eye. It is used to fight an infection caused by bacteria. Use as directed.    Take your medicine as directed.  Contact your healthcare provider if you think your medicine is not helping or if you have side effects. Tell your provider if you are allergic to any medicine. Keep a list of the medicines, vitamins, and herbs you take. Include the amounts, and when and why you take them. Bring the list or the pill bottles to follow-up visits. Carry your medicine list with you in case of an emergency.    Manage a stye:   Use warm compresses, as directed.  This will help decrease swelling and pain. Wet a clean washcloth with warm water and place it on your eye for 10 to 15 minutes, 3 to 4 times each day, or as directed.    Keep your hands away from your eye.  This helps to prevent the spread of infection to other parts of the eye. Wash your hands often with soap and dry with a clean towel. Do not squeeze the stye.    Do not wear eye makeup while you have a stye.  Eye makeup may carry bacteria and cause another stye. Throw  away eye makeup and brushes used to apply the makeup. Use new eye makeup after the stye has gone away. Do not share eye makeup with others.    Prevent another stye:  Wash your face and clean your eyelashes every day. Remove eye makeup with makeup remover. This helps to remove eye makeup without heavy rubbing.  Follow up with your doctor as directed:  Write down your questions so you remember to ask them during your visits.  © Copyright [employer] 2023 Information is for End User's use only and may not be sold, redistributed or otherwise used for commercial purposes.  The above information is an  only. It is not intended as medical advice for individual conditions or treatments. Talk to your doctor, nurse or pharmacist before following any medical regimen to see if it is safe and effective for you.

## 2024-06-06 ENCOUNTER — APPOINTMENT (OUTPATIENT)
Age: 54
End: 2024-06-06
Payer: COMMERCIAL

## 2024-06-06 DIAGNOSIS — Z00.00 ROUTINE GENERAL MEDICAL EXAMINATION AT A HEALTH CARE FACILITY: ICD-10-CM

## 2024-06-06 DIAGNOSIS — Z13.6 SCREENING FOR CARDIOVASCULAR CONDITION: ICD-10-CM

## 2024-06-06 DIAGNOSIS — M13.0 POLYARTHROPATHY: ICD-10-CM

## 2024-06-06 DIAGNOSIS — M13.0 POLYARTHRITIS: ICD-10-CM

## 2024-06-06 PROCEDURE — 86618 LYME DISEASE ANTIBODY: CPT

## 2024-06-06 PROCEDURE — 73130 X-RAY EXAM OF HAND: CPT

## 2024-06-06 PROCEDURE — 86038 ANTINUCLEAR ANTIBODIES: CPT

## 2024-06-07 LAB
ANA SER QL IA: NEGATIVE
B BURGDOR IGG+IGM SER QL IA: NEGATIVE

## 2024-11-26 ENCOUNTER — OFFICE VISIT (OUTPATIENT)
Dept: OBGYN CLINIC | Facility: CLINIC | Age: 54
End: 2024-11-26
Payer: COMMERCIAL

## 2024-11-26 VITALS — WEIGHT: 135 LBS | HEIGHT: 63 IN | BODY MASS INDEX: 23.92 KG/M2

## 2024-11-26 DIAGNOSIS — M65.4 TENOSYNOVITIS, DE QUERVAIN: ICD-10-CM

## 2024-11-26 PROCEDURE — 99214 OFFICE O/P EST MOD 30 MIN: CPT | Performed by: STUDENT IN AN ORGANIZED HEALTH CARE EDUCATION/TRAINING PROGRAM

## 2024-11-26 PROCEDURE — 20550 NJX 1 TENDON SHEATH/LIGAMENT: CPT | Performed by: STUDENT IN AN ORGANIZED HEALTH CARE EDUCATION/TRAINING PROGRAM

## 2024-11-26 RX ADMIN — BETAMETHASONE SODIUM PHOSPHATE AND BETAMETHASONE ACETATE 6 MG: 3; 3 INJECTION, SUSPENSION INTRA-ARTICULAR; INTRALESIONAL; INTRAMUSCULAR; SOFT TISSUE at 13:30

## 2024-11-26 RX ADMIN — BUPIVACAINE HYDROCHLORIDE 1 ML: 2.5 INJECTION, SOLUTION INFILTRATION; PERINEURAL at 13:30

## 2024-11-26 NOTE — PROGRESS NOTES
ORTHOPAEDIC HAND, WRIST, AND ELBOW OFFICE  VISIT      ASSESSMENT/PLAN:      Diagnoses and all orders for this visit:    Tenosynovitis, de Quervain, left  -     Ambulatory Referral to Hand Surgery  -     Hand/upper extremity injection: L extensor compartment 1          53 y.o. female with left De Quervain's tenosynovitis  Diagnostics reviewed and physical exam performed.  Diagnosis, treatment options and associated risks were discussed with the patient including no treatment, nonsurgical treatment and potential for surgical intervention.  The patient was given the opportunity to ask questions regarding each.   Patient was offered, accepted, and received a cortisone injection of the left first dorsal compartment today. Patient tolerated procedure well with no immediate complications. Post-injection protocols and expectations were discussed with the patient.   Recommend use of her thumb spica brace at night, and comfort cool brace during the day as needed  Apply Voltaren gel to painful area up to 4 times a day  May use ice or heat as needed for pain relief  May take NSAIDs/Tylenol as needed for pain control  Follow up as needed      Follow Up:  PRN       To Do Next Visit:  Re-evaluation of current issue      Discussions:  De Quervain Tenosynovitis: The anatomy and physiology of de Quervain's tenosynovitis was discussed with the patient today in the office.  Edema and increased contact pressure within the first dorsal extensor compartment at the radial styloid can cause pain, crepitation, and limitation of function.  Treatment options include resting thumb spica splints to decrease edema, oral anti-inflammatory medications, home or formal therapy exercises, up to 2 steroid injections within the first dorsal extensor compartment, or surgical release.  While majority of patients do respond to conservative treatment, up to 20% may require surgical release.       Naldo Umaña MD  Attending, Orthopaedic Surgery  Hand,  Wrist, and Elbow Surgery  [facility] Orth[facility]    ______________________________________________________________________________________________    CHIEF COMPLAINT:  Chief Complaint   Patient presents with    Left Wrist - Pain     Patient has had pain for the last year she has been wearing a brace does not help. 6 months ago she has an injection which helped for about five months would like another injection today       SUBJECTIVE:  Patient is a 53 y.o. RHD female who presents today for evaluation and treatment of left wrist pain. She was previously seen and evaluated by Dr. Plummer on 1/23/2024 where an injection was provided to the first dorsal compartment which the patient states provided her significant pain relief. She is a  and notes carrying trays bothers her wrist/thumb pain. She denies distal paraesthesias, mechanical symptoms, previous wrist or thumb injury.      Occupation:      I have personally reviewed all the relevant PMH, PSH, SH, FH, Medications and allergies      PAST MEDICAL HISTORY:  History reviewed. No pertinent past medical history.    PAST SURGICAL HISTORY:  History reviewed. No pertinent surgical history.    FAMILY HISTORY:  History reviewed. No pertinent family history.    SOCIAL HISTORY:  Social History     Tobacco Use    Smoking status: Unknown       MEDICATIONS:    Current Outpatient Medications:     fluticasone (FLONASE) 50 mcg/act nasal spray, SPRAY 2 SPRAYS INTO EACH NOSTRIL EVERY DAY, Disp: , Rfl:     gabapentin (NEURONTIN) 100 mg capsule, Take 1 capsule (100 mg total) by mouth 3 (three) times a day, Disp: 90 capsule, Rfl: 0    famotidine (PEPCID) 20 mg tablet, Take 20 mg by mouth 2 (two) times a day (Patient not taking: Reported on 12/8/2023), Disp: , Rfl:     naproxen (Naprosyn) 500 mg tablet, Take 1 tablet (500 mg total) by mouth 2 (two) times a day with meals for 7 days (Patient not taking: Reported on 2/6/2024), Disp: 14 tablet, Rfl:  "0    ALLERGIES:  Allergies   Allergen Reactions    Ciprofloxacin Other (See Comments)    Fluvastatin Throat Swelling           REVIEW OF SYSTEMS:  Musculoskeletal:        As noted in HPI.   All other systems reviewed and are negative.    VITALS:  There were no vitals filed for this visit.    LABS:  HgA1c: No results found for: \"HGBA1C\"  BMP:   Lab Results   Component Value Date    CALCIUM 9.3 06/06/2024    K 4.2 06/06/2024    CO2 25 06/06/2024     06/06/2024    BUN 12 06/06/2024    CREATININE 0.56 (L) 06/06/2024       _____________________________________________________  PHYSICAL EXAMINATION:  General: Well developed and well nourished, alert & oriented x 3, appears comfortable  Psychiatric: Normal  HEENT: Normocephalic, Atraumatic Trachea Midline, No torticollis  Pulmonary: No audible wheezing or respiratory distress   Abdomen/GI: Non tender, non distended   Cardiovascular: No pitting edema, 2+ radial pulse   Skin: No masses, erythema, lacerations, fluctation, ulcerations  Neurovascular: Sensation Intact to the Median, Ulnar, Radial Nerve, Motor Intact to the Median, Ulnar, Radial Nerve, and Pulses Intact  Musculoskeletal: Normal, except as noted in detailed exam and in HPI.      MUSCULOSKELETAL EXAMINATION:  Left De Quervain's Exam:  Patient presents with no obvious anatomical deformity  Skin is warm and dry to touch with no signs of erythema, ecchymosis, or infection  No soft tissue swelling or effusion noted  Nontender thumb CMC joint or A1 pulley  Positive tender to palpation over 1st dorsal extensor compartment   Negative palpable nodule  Positive crepitus over 1st dorsal extensor compartment   Positive Finkelstein's test    Positive pain with resisted abduction of the thumb  Negative CMC Grind test  Opposition strength 5/5  Intrinsic strength 5/5.  AIN 5/5  Demonstrates normal wrist, elbow, and shoulder motion  Forearm compartments are soft and supple  2+ distal radial pulse with brisk capillary refill " "to the fingers  Radial, median, and ulnar motor and sensory distribution intact  Sensations light to touch intact distally      ___________________________________________________  STUDIES REVIEWED:  Xrays of the left hand were reviewed and independently interpreted in PACS by Dr. Umaña and demonstrate no acute osseous abnormalities          PROCEDURES PERFORMED:  Hand/upper extremity injection: L extensor compartment 1  Universal Protocol:  Consent: Verbal consent obtained.  Risks and benefits: risks, benefits and alternatives were discussed  Consent given by: patient  Time out: Immediately prior to procedure a \"time out\" was called to verify the correct patient, procedure, equipment, support staff and site/side marked as required.  Patient understanding: patient states understanding of the procedure being performed  Site marked: the operative site was marked  Patient identity confirmed: verbally with patient  Supporting Documentation  Indications: tendon swelling   Procedure Details  Condition:de Quervain's tenosynovitis Site: L extensor compartment 1   Preparation: Patient was prepped and draped in the usual sterile fashion  Needle size: 25 G  Approach: volar  Medications administered: 6 mg betamethasone acetate-betamethasone sodium phosphate 6 (3-3) mg/mL; 1 mL bupivacaine 0.25 %  Patient tolerance: patient tolerated the procedure well with no immediate complications  Dressing:  Sterile dressing applied              _____________________________________________________      Scribe Attestation      I,:  Lynnette Bishop am acting as a scribe while in the presence of the attending physician.:       I,:  Naldo Umaña MD personally performed the services described in this documentation    as scribed in my presence.:             "

## 2024-11-27 RX ORDER — BUPIVACAINE HYDROCHLORIDE 2.5 MG/ML
1 INJECTION, SOLUTION INFILTRATION; PERINEURAL
Status: COMPLETED | OUTPATIENT
Start: 2024-11-26 | End: 2024-11-26

## 2024-11-27 RX ORDER — BETAMETHASONE SODIUM PHOSPHATE AND BETAMETHASONE ACETATE 3; 3 MG/ML; MG/ML
6 INJECTION, SUSPENSION INTRA-ARTICULAR; INTRALESIONAL; INTRAMUSCULAR; SOFT TISSUE
Status: COMPLETED | OUTPATIENT
Start: 2024-11-26 | End: 2024-11-26

## 2024-12-19 ENCOUNTER — HOSPITAL ENCOUNTER (EMERGENCY)
Facility: HOSPITAL | Age: 54
Discharge: HOME | End: 2024-12-19
Attending: EMERGENCY MEDICINE
Payer: COMMERCIAL

## 2024-12-19 VITALS
TEMPERATURE: 98 F | DIASTOLIC BLOOD PRESSURE: 91 MMHG | BODY MASS INDEX: 23.63 KG/M2 | RESPIRATION RATE: 20 BRPM | OXYGEN SATURATION: 99 % | HEART RATE: 85 BPM | SYSTOLIC BLOOD PRESSURE: 164 MMHG | HEIGHT: 63 IN | WEIGHT: 133.38 LBS

## 2024-12-19 DIAGNOSIS — Z23 ENCOUNTER FOR ADMINISTRATION OF VACCINE: Primary | ICD-10-CM

## 2024-12-19 PROCEDURE — 90675 RABIES VACCINE IM: CPT | Performed by: EMERGENCY MEDICINE

## 2024-12-19 PROCEDURE — 90472 IMMUNIZATION ADMIN EACH ADD: CPT

## 2024-12-19 PROCEDURE — 90471 IMMUNIZATION ADMIN: CPT

## 2024-12-19 PROCEDURE — 99283 EMERGENCY DEPT VISIT LOW MDM: CPT | Performed by: EMERGENCY MEDICINE

## 2024-12-19 PROCEDURE — 96372 THER/PROPH/DIAG INJ SC/IM: CPT

## 2024-12-19 PROCEDURE — 90375 RABIES IG IM/SC: CPT | Performed by: EMERGENCY MEDICINE

## 2024-12-19 PROCEDURE — 99283 EMERGENCY DEPT VISIT LOW MDM: CPT

## 2024-12-19 RX ADMIN — RABIES VIRUS STRAIN PM-1503-3M ANTIGEN (PROPIOLACTONE INACTIVATED) AND WATER 1 ML: KIT at 19:18

## 2024-12-19 RX ADMIN — RABIES IMMUNE GLOBULIN (HUMAN) 1200 UNITS: 300 INJECTION, SOLUTION INFILTRATION; INTRAMUSCULAR at 19:16

## 2024-12-19 NOTE — ED PROVIDER NOTES
ED Disposition       None          Assessment & Plan       Medical Decision Making  Patient is a 54-year-old female past medical history of ankylosing spondylitis presenting for rabies vaccine.  Patient is well-appearing at bedside with stable vitals and in no acute distress.  She has no wounds and no complaints at this time.  Will give rabies vaccine and have advised that she return for full rabies series and will give rabies immunoglobulin.    Risk  Prescription drug management.             Medications   rabies vaccine, human diploid IM injection 1 mL (has no administration in time range)   rabies immune globulin, human (HyperRAB) injection 1,200 Units (has no administration in time range)       ED Risk Strat Scores                                              History of Present Illness       Chief Complaint   Patient presents with    Medical Problem     Patient told to come to ED for rabies series by TIM d/t her daughter's dog attacking a raccoon that tested positive for rabies and the dog later licked her.         Past Medical History:   Diagnosis Date    Personal history of ankylosing spondylitis       Past Surgical History:   Procedure Laterality Date    MANDIBLE FRACTURE SURGERY      SINUS SURGERY      TONSILLECTOMY        History reviewed. No pertinent family history.   Social History     Tobacco Use    Smoking status: Unknown   Vaping Use    Vaping status: Never Used   Substance Use Topics    Alcohol use: Never    Drug use: Never      E-Cigarette/Vaping    E-Cigarette Use Never User       E-Cigarette/Vaping Substances      I have reviewed and agree with the history as documented.     Patient is a 54-year-old female past medical history of ankylosing spondylitis presenting for rabies vaccine.  Patient states 3 days ago her dog attacked a raccoon and the raccoon's brain was sent for testing and was found to be positive for rabies.  She states the Department of Health contacted her and told him that the  entire household would have to be vaccinated against rabies.  She denies any wounds.        Review of Systems   All other systems reviewed and are negative.          Objective       ED Triage Vitals [12/19/24 1828]   Temperature Pulse Blood Pressure Respirations SpO2 Patient Position - Orthostatic VS   98 °F (36.7 °C) 97 (!) 182/103 18 100 % Sitting      Temp Source Heart Rate Source BP Location FiO2 (%) Pain Score    Temporal Monitor Left arm -- --      Vitals      Date and Time Temp Pulse SpO2 Resp BP Pain Score FACES Pain Rating User   12/19/24 1828 98 °F (36.7 °C) 97 100 % 18 182/103 -- -- RO            Physical Exam  Vitals reviewed.   Constitutional:       General: She is not in acute distress.     Appearance: Normal appearance. She is not ill-appearing.   HENT:      Mouth/Throat:      Mouth: Mucous membranes are moist.   Eyes:      Conjunctiva/sclera: Conjunctivae normal.   Cardiovascular:      Rate and Rhythm: Normal rate and regular rhythm.      Heart sounds: Normal heart sounds.   Pulmonary:      Effort: Pulmonary effort is normal.      Breath sounds: Normal breath sounds.   Abdominal:      General: Abdomen is flat.      Palpations: Abdomen is soft.      Tenderness: There is no abdominal tenderness.   Musculoskeletal:         General: No swelling. Normal range of motion.      Cervical back: Neck supple.   Skin:     General: Skin is warm and dry.   Neurological:      General: No focal deficit present.      Mental Status: She is alert.   Psychiatric:         Mood and Affect: Mood normal.         Results Reviewed       None            No orders to display       Procedures    ED Medication and Procedure Management   Prior to Admission Medications   Prescriptions Last Dose Informant Patient Reported? Taking?   famotidine (PEPCID) 20 mg tablet   Yes No   Sig: Take 20 mg by mouth 2 (two) times a day   Patient not taking: Reported on 12/8/2023   fluticasone (FLONASE) 50 mcg/act nasal spray   Yes No   Sig: SPRAY 2  SPRAYS INTO EACH NOSTRIL EVERY DAY   gabapentin (NEURONTIN) 100 mg capsule   No No   Sig: Take 1 capsule (100 mg total) by mouth 3 (three) times a day   naproxen (Naprosyn) 500 mg tablet   No No   Sig: Take 1 tablet (500 mg total) by mouth 2 (two) times a day with meals for 7 days   Patient not taking: Reported on 2/6/2024      Facility-Administered Medications: None     Patient's Medications   Discharge Prescriptions    No medications on file     No discharge procedures on file.  ED SEPSIS DOCUMENTATION            Sima Magana DO  12/19/24 1718

## 2024-12-23 ENCOUNTER — HOSPITAL ENCOUNTER (EMERGENCY)
Facility: HOSPITAL | Age: 54
Discharge: HOME | End: 2024-12-23
Attending: EMERGENCY MEDICINE
Payer: COMMERCIAL

## 2024-12-23 VITALS
DIASTOLIC BLOOD PRESSURE: 78 MMHG | RESPIRATION RATE: 18 BRPM | HEART RATE: 76 BPM | WEIGHT: 137.35 LBS | OXYGEN SATURATION: 100 % | BODY MASS INDEX: 24.33 KG/M2 | TEMPERATURE: 98.1 F | SYSTOLIC BLOOD PRESSURE: 179 MMHG

## 2024-12-23 DIAGNOSIS — Z23 ENCOUNTER FOR REPEAT ADMINISTRATION OF RABIES VACCINATION: Primary | ICD-10-CM

## 2024-12-23 PROCEDURE — 90675 RABIES VACCINE IM: CPT | Performed by: EMERGENCY MEDICINE

## 2024-12-23 PROCEDURE — 90471 IMMUNIZATION ADMIN: CPT

## 2024-12-23 PROCEDURE — 99282 EMERGENCY DEPT VISIT SF MDM: CPT | Performed by: EMERGENCY MEDICINE

## 2024-12-23 RX ADMIN — RABIES VIRUS STRAIN PM-1503-3M ANTIGEN (PROPIOLACTONE INACTIVATED) AND WATER 1 ML: KIT at 16:07

## 2024-12-23 NOTE — DISCHARGE INSTRUCTIONS
A  personal message from Dr. Gomez Mace,  Thank you so much for allowing me to care for you today.    I pride myself in the care and attention I give all my patients.  I hope you were a witness to this tonight.   If for any reason your condition does not improve or worsens, or you have a question that was not answered during your visit you can feel free to text me on my personal phone #  # [phone].   I will answer to your message and continue your care past your emergency room visit.     Please understand that although you are being discharged because your condition has been deemed stable and able to be managed on an outpatient setting. However your condition may worsen as part of the natural progression of the illness/condition, if this occurs please come back to the emergency department for a repeat evaluation.

## 2024-12-23 NOTE — ED PROVIDER NOTES
Time reflects when diagnosis was documented in both MDM as applicable and the Disposition within this note       Time User Action Codes Description Comment    12/23/2024  3:51 PM Gomez Mace Add [Z23] Encounter for repeat administration of rabies vaccination           ED Disposition       ED Disposition   Discharge    Condition   Stable    Date/Time   Mon Dec 23, 2024  3:51 PM    Comment   Luke Garcia discharge to home/self care.                   Assessment & Plan       Medical Decision Making  Problems Addressed:  Encounter for repeat administration of rabies vaccination: acute illness or injury    Risk  Prescription drug management.             Medications   rabies vaccine, human diploid IM injection 1 mL (1 mL Intramuscular Given 12/23/24 1607)       ED Risk Strat Scores                          SBIRT 22yo+      Flowsheet Row Most Recent Value   Initial Alcohol Screen: US AUDIT-C     1. How often do you have a drink containing alcohol? 0 Filed at: 12/23/2024 1542   2. How many drinks containing alcohol do you have on a typical day you are drinking?  0 Filed at: 12/23/2024 1542   3b. FEMALE Any Age, or MALE 65+: How often do you have 4 or more drinks on one occassion? 0 Filed at: 12/23/2024 1542   Audit-C Score 0 Filed at: 12/23/2024 1542   ORTIZ: How many times in the past year have you...    Used an illegal drug or used a prescription medication for non-medical reasons? Never Filed at: 12/23/2024 1542                            History of Present Illness       Chief Complaint   Patient presents with    Follow Up Rabies     Pt states she is here for her second round of rabies vaccinations. Pt received first set on 12/19.        Past Medical History:   Diagnosis Date    Personal history of ankylosing spondylitis       Past Surgical History:   Procedure Laterality Date    MANDIBLE FRACTURE SURGERY      SINUS SURGERY      TONSILLECTOMY        No family history on file.   Social History     Tobacco Use    Smoking  status: Unknown   Vaping Use    Vaping status: Never Used   Substance Use Topics    Alcohol use: Never    Drug use: Never      E-Cigarette/Vaping    E-Cigarette Use Never User       E-Cigarette/Vaping Substances      I have reviewed and agree with the history as documented.     Luke Garcia is a 54 y.o.  year old female  Past Medical History:  No date: Personal history of ankylosing spondylitis  Social History    Tobacco Use      Smoking status: Unknown    Vaping Use      Vaping status: Never Used    Alcohol use: Never    Drug use: Never    Patient presents with:  Follow Up Rabies: Pt states she is here for her second round of rabies vaccinations. Pt received first set on 12/19.   No complications  Feeling well                     History provided by:  Patient   used: No        Review of Systems   Constitutional:  Negative for chills and fever.   HENT:  Negative for ear pain and sore throat.    Eyes:  Negative for pain and visual disturbance.   Respiratory:  Negative for cough and shortness of breath.    Cardiovascular:  Negative for chest pain and palpitations.   Gastrointestinal:  Negative for abdominal pain and vomiting.   Genitourinary:  Negative for dysuria and hematuria.   Musculoskeletal:  Negative for arthralgias and back pain.   Skin:  Negative for color change and rash.   Neurological:  Negative for seizures and syncope.   All other systems reviewed and are negative.          Objective       ED Triage Vitals [12/23/24 1539]   Temperature Pulse Blood Pressure Respirations SpO2 Patient Position - Orthostatic VS   98.1 °F (36.7 °C) 76 (!) 179/78 18 100 % Sitting      Temp Source Heart Rate Source BP Location FiO2 (%) Pain Score    Temporal Monitor Left arm -- --      Vitals      Date and Time Temp Pulse SpO2 Resp BP Pain Score FACES Pain Rating User   12/23/24 1539 98.1 °F (36.7 °C) 76 100 % 18 179/78 -- -- KW            Physical Exam  Vitals reviewed.   Constitutional:       Appearance:  Normal appearance.   HENT:      Head: Normocephalic.      Right Ear: External ear normal.      Left Ear: External ear normal.      Mouth/Throat:      Mouth: Mucous membranes are moist.   Eyes:      Pupils: Pupils are equal, round, and reactive to light.   Cardiovascular:      Rate and Rhythm: Normal rate.   Pulmonary:      Effort: Pulmonary effort is normal.   Skin:     Capillary Refill: Capillary refill takes less than 2 seconds.   Neurological:      General: No focal deficit present.      Mental Status: She is alert and oriented to person, place, and time.   Psychiatric:         Mood and Affect: Mood normal.         Thought Content: Thought content normal.         Results Reviewed       None            No orders to display       Procedures    ED Medication and Procedure Management   Prior to Admission Medications   Prescriptions Last Dose Informant Patient Reported? Taking?   famotidine (PEPCID) 20 mg tablet   Yes No   Sig: Take 20 mg by mouth 2 (two) times a day   Patient not taking: Reported on 12/8/2023   fluticasone (FLONASE) 50 mcg/act nasal spray   Yes No   Sig: SPRAY 2 SPRAYS INTO EACH NOSTRIL EVERY DAY   gabapentin (NEURONTIN) 100 mg capsule   No No   Sig: Take 1 capsule (100 mg total) by mouth 3 (three) times a day   naproxen (Naprosyn) 500 mg tablet   No No   Sig: Take 1 tablet (500 mg total) by mouth 2 (two) times a day with meals for 7 days   Patient not taking: Reported on 2/6/2024      Facility-Administered Medications: None     Discharge Medication List as of 12/23/2024  3:53 PM        CONTINUE these medications which have NOT CHANGED    Details   famotidine (PEPCID) 20 mg tablet Take 20 mg by mouth 2 (two) times a day, Starting Mon 3/6/2023, Until Tue 3/5/2024, Historical Med      fluticasone (FLONASE) 50 mcg/act nasal spray SPRAY 2 SPRAYS INTO EACH NOSTRIL EVERY DAY, Historical Med      gabapentin (NEURONTIN) 100 mg capsule Take 1 capsule (100 mg total) by mouth 3 (three) times a day, Starting Tue  10/29/2024, Normal      naproxen (Naprosyn) 500 mg tablet Take 1 tablet (500 mg total) by mouth 2 (two) times a day with meals for 7 days, Starting Fri 12/8/2023, Until Tue 2/6/2024, Normal           No discharge procedures on file.  ED SEPSIS DOCUMENTATION   Time reflects when diagnosis was documented in both MDM as applicable and the Disposition within this note       Time User Action Codes Description Comment    12/23/2024  3:51 PM Gomez Mace Add [Z23] Encounter for repeat administration of rabies vaccination                  Gomez Mace MD  12/23/24 3670

## 2024-12-26 ENCOUNTER — HOSPITAL ENCOUNTER (EMERGENCY)
Facility: HOSPITAL | Age: 54
Discharge: HOME | End: 2024-12-26
Payer: COMMERCIAL

## 2024-12-26 VITALS
HEART RATE: 96 BPM | SYSTOLIC BLOOD PRESSURE: 155 MMHG | RESPIRATION RATE: 18 BRPM | TEMPERATURE: 97.6 F | DIASTOLIC BLOOD PRESSURE: 93 MMHG | OXYGEN SATURATION: 99 %

## 2024-12-26 DIAGNOSIS — Z23 ENCOUNTER FOR REPEAT ADMINISTRATION OF RABIES VACCINATION: Primary | ICD-10-CM

## 2024-12-26 PROCEDURE — 90675 RABIES VACCINE IM: CPT

## 2024-12-26 PROCEDURE — 99282 EMERGENCY DEPT VISIT SF MDM: CPT

## 2024-12-26 PROCEDURE — 90471 IMMUNIZATION ADMIN: CPT

## 2024-12-26 RX ADMIN — Medication 1 ML: at 17:13

## 2024-12-26 NOTE — ED PROVIDER NOTES
ED Disposition       None          Assessment & Plan   {Hyperlinks  Risk Stratification - NIHSS - HEART SCORE - Fill out sepsis note and make sure you call [id number] if severe or septic shock:[id number]}    Medical Decision Making  Risk  Prescription drug management.           Medications   rabies vaccine, human diploid IM injection 1 mL (1 mL Intramuscular Given 12/26/24 1713)       ED Risk Strat Scores                          SBIRT 20yo+      Flowsheet Row Most Recent Value   Initial Alcohol Screen: US AUDIT-C     1. How often do you have a drink containing alcohol? 0 Filed at: 12/26/2024 1711   2. How many drinks containing alcohol do you have on a typical day you are drinking?  0 Filed at: 12/26/2024 1711   3b. FEMALE Any Age, or MALE 65+: How often do you have 4 or more drinks on one occassion? 0 Filed at: 12/26/2024 1711   Audit-C Score 0 Filed at: 12/26/2024 1711   ORTIZ: How many times in the past year have you...    Used an illegal drug or used a prescription medication for non-medical reasons? Never Filed at: 12/26/2024 1711                            History of Present Illness   {Hyperlinks  History (Med, Surg, Fam, Social) - Current Medications - Allergies  :[id number]}    Chief Complaint   Patient presents with   • Follow Up Rabies     Pt states her for rabies follow up shot (#3), last one on Monday       Past Medical History:   Diagnosis Date   • Personal history of ankylosing spondylitis       Past Surgical History:   Procedure Laterality Date   • MANDIBLE FRACTURE SURGERY     • SINUS SURGERY     • TONSILLECTOMY        No family history on file.   Social History     Tobacco Use   • Smoking status: Unknown   Vaping Use   • Vaping status: Never Used   Substance Use Topics   • Alcohol use: Never   • Drug use: Never      E-Cigarette/Vaping   • E-Cigarette Use Never User       E-Cigarette/Vaping Substances      I have reviewed and agree with the history as documented.     Patient presenting for  renée      Feels well      Review of Systems        Objective   {Hyperlinks  Historical Vitals - Historical Labs - Chart Review/Microbiology - Last Echo - Code Status  :[id number]}    ED Triage Vitals [12/26/24 1709]   Temperature Pulse Blood Pressure Respirations SpO2 Patient Position - Orthostatic VS   97.6 °F (36.4 °C) 96 155/93 18 99 % Sitting      Temp Source Heart Rate Source BP Location FiO2 (%) Pain Score    Temporal -- Left arm -- --      Vitals      Date and Time Temp Pulse SpO2 Resp BP Pain Score FACES Pain Rating User   12/26/24 1709 97.6 °F (36.4 °C) 96 99 % 18 155/93 -- -- FS            Physical Exam    Results Reviewed       None            No orders to display       Procedures    ED Medication and Procedure Management   Cannot display prior to admission medications because the patient has not been admitted in this contact.     Patient's Medications   Discharge Prescriptions    No medications on file     No discharge procedures on file.  ED SEPSIS DOCUMENTATION          ill-appearing, toxic-appearing or diaphoretic.   HENT:      Head: Normocephalic and atraumatic.   Eyes:      General: No scleral icterus.        Right eye: No discharge.         Left eye: No discharge.      Extraocular Movements: Extraocular movements intact.      Conjunctiva/sclera: Conjunctivae normal.   Cardiovascular:      Rate and Rhythm: Normal rate.      Pulses: Normal pulses.   Pulmonary:      Effort: Pulmonary effort is normal. No respiratory distress.   Abdominal:      General: Abdomen is flat.      Palpations: Abdomen is soft.   Musculoskeletal:         General: Normal range of motion.      Cervical back: Normal range of motion. No rigidity.   Skin:     General: Skin is warm and dry.      Coloration: Skin is not jaundiced.      Findings: No bruising or lesion.   Neurological:      General: No focal deficit present.      Mental Status: She is alert and oriented to person, place, and time. Mental status is at baseline.   Psychiatric:         Mood and Affect: Mood normal.         Behavior: Behavior normal.         Thought Content: Thought content normal.         Judgment: Judgment normal.         Results Reviewed       None            No orders to display       Procedures    ED Medication and Procedure Management   Prior to Admission Medications   Prescriptions Last Dose Informant Patient Reported? Taking?   famotidine (PEPCID) 20 mg tablet   Yes No   Sig: Take 20 mg by mouth 2 (two) times a day   Patient not taking: Reported on 12/8/2023   fluticasone (FLONASE) 50 mcg/act nasal spray   Yes No   Sig: SPRAY 2 SPRAYS INTO EACH NOSTRIL EVERY DAY   gabapentin (NEURONTIN) 100 mg capsule   No No   Sig: Take 1 capsule (100 mg total) by mouth 3 (three) times a day   naproxen (Naprosyn) 500 mg tablet   No No   Sig: Take 1 tablet (500 mg total) by mouth 2 (two) times a day with meals for 7 days   Patient not taking: Reported on 2/6/2024      Facility-Administered Medications: None     Discharge Medication List as of  12/26/2024  5:15 PM        CONTINUE these medications which have NOT CHANGED    Details   famotidine (PEPCID) 20 mg tablet Take 20 mg by mouth 2 (two) times a day, Starting Mon 3/6/2023, Until Tue 3/5/2024, Historical Med      fluticasone (FLONASE) 50 mcg/act nasal spray SPRAY 2 SPRAYS INTO EACH NOSTRIL EVERY DAY, Historical Med      gabapentin (NEURONTIN) 100 mg capsule Take 1 capsule (100 mg total) by mouth 3 (three) times a day, Starting Tue 10/29/2024, Normal      naproxen (Naprosyn) 500 mg tablet Take 1 tablet (500 mg total) by mouth 2 (two) times a day with meals for 7 days, Starting Fri 12/8/2023, Until Tue 2/6/2024, Normal           No discharge procedures on file.  ED SEPSIS DOCUMENTATION   Time reflects when diagnosis was documented in both MDM as applicable and the Disposition within this note       Time User Action Codes Description Comment    12/26/2024  5:14 PM Rosa Maria Handy Add [Z23] Encounter for repeat administration of rabies vaccination                  Rosa Maria Handy DO  12/31/24 0836

## 2024-12-26 NOTE — DISCHARGE INSTRUCTIONS
Today you received your third administration of your rabies vaccine. Your final (fourth) rabies administration will occur on January 2nd, 2025. This can be given through a . [place]'s ER or a [facility]'s urgent care. Return to the ER for new/worsening symptoms. Follow-up with your family physician as needed.

## 2025-01-03 ENCOUNTER — HOSPITAL ENCOUNTER (EMERGENCY)
Facility: HOSPITAL | Age: 55
Discharge: HOME | End: 2025-01-03
Attending: EMERGENCY MEDICINE | Admitting: EMERGENCY MEDICINE
Payer: COMMERCIAL

## 2025-01-03 VITALS
RESPIRATION RATE: 18 BRPM | OXYGEN SATURATION: 100 % | TEMPERATURE: 97.8 F | SYSTOLIC BLOOD PRESSURE: 160 MMHG | DIASTOLIC BLOOD PRESSURE: 97 MMHG | HEART RATE: 82 BPM

## 2025-01-03 DIAGNOSIS — Z23 ENCOUNTER FOR REPEAT ADMINISTRATION OF RABIES VACCINATION: Primary | ICD-10-CM

## 2025-01-03 PROCEDURE — 99284 EMERGENCY DEPT VISIT MOD MDM: CPT | Performed by: EMERGENCY MEDICINE

## 2025-01-03 PROCEDURE — 90675 RABIES VACCINE IM: CPT | Performed by: EMERGENCY MEDICINE

## 2025-01-03 PROCEDURE — 90471 IMMUNIZATION ADMIN: CPT

## 2025-01-03 RX ADMIN — RABIES VIRUS STRAIN PM-1503-3M ANTIGEN (PROPIOLACTONE INACTIVATED) AND WATER 1 ML: KIT at 19:01

## 2025-01-03 NOTE — ED PROVIDER NOTES
Time reflects when diagnosis was documented in both MDM as applicable and the Disposition within this note       Time User Action Codes Description Comment    1/3/2025  6:57 PM Melvin Harmon Add [Z23] Encounter for repeat administration of rabies vaccination           ED Disposition       ED Disposition   Discharge    Condition   Stable    Date/Time   Fri Paco 3, 2025  6:57 PM    Comment   Luke Garcia discharge to home/self care.                   Assessment & Plan       Medical Decision Making  Risk  Prescription drug management.             Medications   rabies vaccine, human diploid IM injection 1 mL (1 mL Intramuscular Given 1/3/25 1901)       ED Risk Strat Scores                          SBIRT 22yo+      Flowsheet Row Most Recent Value   Initial Alcohol Screen: US AUDIT-C     1. How often do you have a drink containing alcohol? 0 Filed at: 01/03/2025 1859   2. How many drinks containing alcohol do you have on a typical day you are drinking?  0 Filed at: 01/03/2025 1859   3a. Male UNDER 65: How often do you have five or more drinks on one occasion? 0 Filed at: 01/03/2025 1859   3b. FEMALE Any Age, or MALE 65+: How often do you have 4 or more drinks on one occassion? 0 Filed at: 01/03/2025 1859   Audit-C Score 0 Filed at: 01/03/2025 1859   ORTIZ: How many times in the past year have you...    Used an illegal drug or used a prescription medication for non-medical reasons? Never Filed at: 01/03/2025 1859                            History of Present Illness       Chief Complaint   Patient presents with    Immunizations     $th shot of Rabies vaccine       Past Medical History:   Diagnosis Date    Personal history of ankylosing spondylitis       Past Surgical History:   Procedure Laterality Date    MANDIBLE FRACTURE SURGERY      SINUS SURGERY      TONSILLECTOMY        No family history on file.   Social History     Tobacco Use    Smoking status: Unknown   Vaping Use    Vaping status: Never Used   Substance Use Topics     Alcohol use: Never    Drug use: Never      E-Cigarette/Vaping    E-Cigarette Use Never User       E-Cigarette/Vaping Substances      I have reviewed and agree with the history as documented.     Here for final rabies shot. No medical concerns.         Review of Systems   Neurological:  Negative for weakness, numbness and headaches.           Objective       ED Triage Vitals [01/03/25 1859]   Temperature Pulse Blood Pressure Respirations SpO2 Patient Position - Orthostatic VS   97.8 °F (36.6 °C) 82 160/97 18 100 % --      Temp Source Heart Rate Source BP Location FiO2 (%) Pain Score    Temporal Monitor Left arm -- --      Vitals      Date and Time Temp Pulse SpO2 Resp BP Pain Score FACES Pain Rating User   01/03/25 1859 97.8 °F (36.6 °C) 82 100 % 18 160/97 -- -- NO            Physical Exam  Vitals and nursing note reviewed.   Constitutional:       General: She is not in acute distress.     Appearance: Normal appearance. She is well-developed. She is not ill-appearing, toxic-appearing or diaphoretic.   HENT:      Head: Normocephalic and atraumatic.   Eyes:      General:         Right eye: No discharge.         Left eye: No discharge.      Conjunctiva/sclera: Conjunctivae normal.      Pupils: Pupils are equal, round, and reactive to light.   Neck:      Vascular: No JVD.   Pulmonary:      Breath sounds: No stridor.   Musculoskeletal:         General: No tenderness or deformity. Normal range of motion.      Cervical back: Normal range of motion and neck supple. No rigidity.   Skin:     General: Skin is warm and dry.      Capillary Refill: Capillary refill takes less than 2 seconds.   Neurological:      General: No focal deficit present.      Mental Status: She is alert and oriented to person, place, and time.      Cranial Nerves: No cranial nerve deficit.      Sensory: No sensory deficit.      Motor: No weakness or abnormal muscle tone.      Coordination: Coordination normal.      Gait: Gait normal.         Results  Reviewed       None            No orders to display       Procedures    ED Medication and Procedure Management   Prior to Admission Medications   Prescriptions Last Dose Informant Patient Reported? Taking?   famotidine (PEPCID) 20 mg tablet   Yes No   Sig: Take 20 mg by mouth 2 (two) times a day   Patient not taking: Reported on 12/8/2023   fluticasone (FLONASE) 50 mcg/act nasal spray   Yes No   Sig: SPRAY 2 SPRAYS INTO EACH NOSTRIL EVERY DAY   gabapentin (NEURONTIN) 100 mg capsule   No No   Sig: Take 1 capsule (100 mg total) by mouth 3 (three) times a day   naproxen (Naprosyn) 500 mg tablet   No No   Sig: Take 1 tablet (500 mg total) by mouth 2 (two) times a day with meals for 7 days   Patient not taking: Reported on 2/6/2024      Facility-Administered Medications: None     Discharge Medication List as of 1/3/2025  7:09 PM        CONTINUE these medications which have NOT CHANGED    Details   famotidine (PEPCID) 20 mg tablet Take 20 mg by mouth 2 (two) times a day, Starting Mon 3/6/2023, Until Tue 3/5/2024, Historical Med      fluticasone (FLONASE) 50 mcg/act nasal spray SPRAY 2 SPRAYS INTO EACH NOSTRIL EVERY DAY, Historical Med      gabapentin (NEURONTIN) 100 mg capsule Take 1 capsule (100 mg total) by mouth 3 (three) times a day, Starting Tue 10/29/2024, Normal      naproxen (Naprosyn) 500 mg tablet Take 1 tablet (500 mg total) by mouth 2 (two) times a day with meals for 7 days, Starting Fri 12/8/2023, Until Tue 2/6/2024, Normal           No discharge procedures on file.  ED SEPSIS DOCUMENTATION   Time reflects when diagnosis was documented in both MDM as applicable and the Disposition within this note       Time User Action Codes Description Comment    1/3/2025  6:57 PM Melvin Harmon Add [Z23] Encounter for repeat administration of rabies vaccination                  Melvin Harmon MD  01/04/25 2064

## 2025-02-19 ENCOUNTER — OFFICE VISIT (OUTPATIENT)
Age: 55
End: 2025-02-19
Payer: COMMERCIAL

## 2025-02-19 VITALS
TEMPERATURE: 97.8 F | DIASTOLIC BLOOD PRESSURE: 86 MMHG | SYSTOLIC BLOOD PRESSURE: 144 MMHG | HEIGHT: 63 IN | OXYGEN SATURATION: 97 % | BODY MASS INDEX: 24.49 KG/M2 | WEIGHT: 138.2 LBS | RESPIRATION RATE: 12 BRPM | HEART RATE: 86 BPM

## 2025-02-19 DIAGNOSIS — F43.23 ADJUSTMENT REACTION WITH ANXIETY AND DEPRESSION: ICD-10-CM

## 2025-02-19 DIAGNOSIS — I10 PRIMARY HYPERTENSION: ICD-10-CM

## 2025-02-19 DIAGNOSIS — Z87.891 HISTORY OF CIGARETTE SMOKING: ICD-10-CM

## 2025-02-19 DIAGNOSIS — Z12.83 SCREENING EXAM FOR SKIN CANCER: ICD-10-CM

## 2025-02-19 DIAGNOSIS — Z78.9 ALCOHOL USE: ICD-10-CM

## 2025-02-19 DIAGNOSIS — Z13.6 ENCOUNTER FOR LIPID SCREENING FOR CARDIOVASCULAR DISEASE: ICD-10-CM

## 2025-02-19 DIAGNOSIS — E78.5 DYSLIPIDEMIA: ICD-10-CM

## 2025-02-19 DIAGNOSIS — E55.9 VITAMIN D INSUFFICIENCY: ICD-10-CM

## 2025-02-19 DIAGNOSIS — Z00.00 ANNUAL PHYSICAL EXAM: Primary | ICD-10-CM

## 2025-02-19 DIAGNOSIS — Z13.220 ENCOUNTER FOR LIPID SCREENING FOR CARDIOVASCULAR DISEASE: ICD-10-CM

## 2025-02-19 DIAGNOSIS — Z72.89 ENGAGES IN VAPING: ICD-10-CM

## 2025-02-19 PROBLEM — N80.03 ADENOMYOSIS: Status: ACTIVE | Noted: 2024-11-24

## 2025-02-19 PROBLEM — N92.4 PERIMENOPAUSAL MENORRHAGIA: Status: ACTIVE | Noted: 2024-12-08

## 2025-02-19 PROCEDURE — 99386 PREV VISIT NEW AGE 40-64: CPT | Performed by: FAMILY MEDICINE

## 2025-02-19 PROCEDURE — 99214 OFFICE O/P EST MOD 30 MIN: CPT | Performed by: FAMILY MEDICINE

## 2025-02-19 RX ORDER — LEFLUNOMIDE 10 MG/1
10 TABLET ORAL DAILY
COMMUNITY

## 2025-02-19 RX ORDER — LISINOPRIL 10 MG/1
10 TABLET ORAL DAILY
Qty: 100 TABLET | Refills: 3 | Status: SHIPPED | OUTPATIENT
Start: 2025-02-19

## 2025-02-19 RX ORDER — OMEGA-3 FATTY ACIDS/FISH OIL 300-1000MG
2 CAPSULE ORAL DAILY
COMMUNITY
Start: 2025-01-16

## 2025-02-19 NOTE — ASSESSMENT & PLAN NOTE
BP Readings from Last 3 Encounters:   02/19/25 144/86   01/03/25 160/97   12/26/24 155/93      Currently not prescribed any meds.   Reports checking her blood and found that she is in the 140s   Assessment: Uncontrolled has been high for about at least a year ow and would recommend medication. Discussed thoroughly why I would recommend starting this now vs later. Will adjust and discontinue when lifestyle improves over time.   Med changes: Yes, adjust to the following:  Start lisinopril 10 mg daily.  Lifestyle modifications recommended, including reduced sodium intake, regular exercise, maintaining a healthy weight, limiting alcohol consumption, and/or avoiding cig smoking.  Return in 3 months for close monitoring.  Complete blood work in the meantime.   Orders:  •  TSH, 3rd generation with Free T4 reflex  •  lisinopril (ZESTRIL) 10 mg tablet; Take 1 tablet (10 mg total) by mouth daily

## 2025-02-19 NOTE — PROGRESS NOTES
[facility]  Annual Physical & Office Visit     PATIENT INFORMATION   Name: Luke Garcia   YOB: 1970   MRN: [id number]  Encounter Provider: Rocky Yancey MD    Encounter Date: 2/19/2025    ASSESSMENT & PLAN     Assessment & Plan  Annual physical exam  Healthcare Maintenance  Health maintenance completed today  - Medical history reviewed, including existing medical conditions, medications, and surgeries.   - Labs discussed to evaluate cholesterol, blood sugar, kidney function, liver function, and other important markers of health.  - BMI evaluated and discussed.  - Lifestyle and health counseling completed including diet, exercise habits, smoking status, alcohol consumption.   - Bone & Heart health reviewed  - Cancer screenings discussed: Mammogram/Pap smear/CT lung/colonoscopy.   - Vaccination status reviewed and pertinent immunizations and booster shots discussed.  - Skin examination: Discussed importance of sunscreen and other preventative measures for skin cancer.  - Mental health and wellbeing evaluated and discussed.  - Family history obtained to identify any of hereditary health risks.  Lab orders in place as discussed  Start/continue preventative measures as discussed/advised  Complete preventative orders in place as recommended.   Refer to screenings problem list   Orders:  •  Comprehensive metabolic panel  •  CBC and differential    Primary hypertension  BP Readings from Last 3 Encounters:   02/19/25 144/86   01/03/25 160/97   12/26/24 155/93      Currently not prescribed any meds.   Reports checking her blood and found that she is in the 140s   Assessment: Uncontrolled has been high for about at least a year ow and would recommend medication. Discussed thoroughly why I would recommend starting this now vs later. Will adjust and discontinue when lifestyle improves over time.   Med changes: Yes, adjust to the following:  Start lisinopril 10 mg daily.  Lifestyle modifications  recommended, including reduced sodium intake, regular exercise, maintaining a healthy weight, limiting alcohol consumption, and/or avoiding cig smoking.  Return in 3 months for close monitoring.  Complete blood work in the meantime.   Orders:  •  TSH, 3rd generation with Free T4 reflex  •  lisinopril (ZESTRIL) 10 mg tablet; Take 1 tablet (10 mg total) by mouth daily    Engages in vaping  Currently vaping daily, switched from cig smoking.   Will be working on discontinuing, currently interested in doing a hypnotherapy   Smoking for a long time, will discuss CT lung cancer screening at next apt.       History of cigarette smoking  Currently vaping daily, switched from cig smoking.   Will be working on discontinuing, currently interested in doing a hypnotherapy   Smoking for a long time, will discuss CT lung cancer screening at next apt.       Alcohol use  Currently drinking 2 drinks daily.   Will continue to discuss and monitor.        Dyslipidemia    Orders:  •  TSH, 3rd generation with Free T4 reflex  •  Lipid Panel with Direct LDL reflex  •  Apolipoprotein B  •  Lipoprotein A (LPA)    Adjustment reaction with anxiety and depression  Currently not prescribed any meds.   Reports no concerns.     Assessment: tearful during visit, and reported trauma that has affected her emotionally but did not go into details.   Med changes: none  Will discuss further at next apt.  Return in 3 months for close monitoring.  Complete blood work in the meantime.   Orders:  •  TSH, 3rd generation with Free T4 reflex    Vitamin D insufficiency    Orders:  •  Vitamin D 25 hydroxy    Encounter for lipid screening for cardiovascular disease    Orders:  •  Lipid Panel with Direct LDL reflex  •  Apolipoprotein B  •  Lipoprotein A (LPA)    Screening exam for skin cancer  A lot of sun spots, would recommend following dermatologist    Orders:  •  Ambulatory referral to Dermatology; Future       COUNSELING    Alcohol/drug use: discussed moderation  in alcohol intake, the recommendations for healthy alcohol use, and avoidance of illicit drug use.  Dental Health: discussed importance of regular tooth brushing, flossing, and dental visits.  Injury prevention: discussed safety/seat belts, safety helmets, smoke detectors, carbon monoxide detectors, and smoking near bedding or upholstery.  Sexual health: discussed sexually transmitted diseases, partner selection, use of condoms, avoidance of unintended pregnancy, and contraceptive alternatives.  Exercise: the importance of regular exercise/physical activity was discussed. Recommend exercise 3-5 times per week for at least 30 minutes.     Depression Screening and Follow-up Plan: Patient was screened for depression during today's encounter. They screened negative with a PHQ-9 score of 0.      Tobacco Cessation Counseling: Tobacco cessation counseling was provided. The patient is sincerely urged to quit consumption of tobacco. She is ready to quit tobacco. Medication options and side effects of medication discussed. Patient refused medication.       HEALTH MAINTENANCE     Immunization History   Administered Date(s) Administered   • COVID-19 MODERNA VACC 0.5 ML IM 11/04/2021, 01/03/2022   • Rabies Immune Globulin 12/19/2024   • Rabies-IM Human Diploid Cell Culture 12/19/2024, 12/23/2024, 12/26/2024, 01/03/2025     Pap smear:Not on file  Mammogram:01/15/2025   Colonoscopy:Not on file Not on file  Cologuard:03/18/2023   DEXA scan:Not on file    FOLLOW UP   Return in about 3 months (around 5/19/2025).    CURRENT MEDICATIONS     Current Outpatient Medications:   •  fluticasone (FLONASE) 50 mcg/act nasal spray, SPRAY 2 SPRAYS INTO EACH NOSTRIL EVERY DAY, Disp: , Rfl:   •  gabapentin (NEURONTIN) 100 mg capsule, Take 1 capsule (100 mg total) by mouth 3 (three) times a day, Disp: 90 capsule, Rfl: 0  •  leflunomide (ARAVA) 10 MG tablet, Take 10 mg by mouth daily, Disp: , Rfl:   •  lisinopril (ZESTRIL) 10 mg tablet, Take 1 tablet  (10 mg total) by mouth daily, Disp: 100 tablet, Rfl: 3  •  Omega 3 1000 MG CAPS, Take 2 capsules by mouth in the morning, Disp: , Rfl:   •  naproxen (Naprosyn) 500 mg tablet, Take 1 tablet (500 mg total) by mouth 2 (two) times a day with meals for 7 days (Patient not taking: Reported on 2024), Disp: 14 tablet, Rfl: 0    ANNUAL PHYSICAL QUESTIONNAIRE    History of Present Illness     Luke Garcia is a 54 y.o. who is here for annual physical exam.  History obtained from : patient  Patricia is mom who is my patient        Concerns today: no    LIFESTYLE  Nutritional intake: well balanced diet  Plain water hydration: fair  Exercise and Movement: getting above 10,000 steps per day and no formal exercise  Do you struggle with weight?  no  Sleep: sleeps poorly and gets 4-6 hours of sleep on average     MENTAL HEALTH  PHQ-2/9 Depression Screening    Little interest or pleasure in doing things: 0 - not at all  Feeling down, depressed, or hopeless: 0 - not at all  Trouble falling or staying asleep, or sleeping too much: 0 - not at all  Feeling tired or having little energy: 0 - not at all  Poor appetite or overeatin - not at all  Feeling bad about yourself - or that you are a failure or have let yourself or your family down: 0 - not at all  Trouble concentrating on things, such as reading the newspaper or watching television: 0 - not at all  Moving or speaking so slowly that other people could have noticed. Or the opposite - being so fidgety or restless that you have been moving around a lot more than usual: 0 - not at all  Thoughts that you would be better off dead, or of hurting yourself in some way: 0 - not at all  PHQ-9 Score: 0  PHQ-9 Interpretation: No or Minimal depression       Anxiety: yes  History of SI/SH: no  Significant past trauma that has impacted patient's mental health: yes  Coping mechanisms with life's worries and obstacles: no    VISION, HEARING, DENTAL HEALTH  Hearing: normal - bilateral  Vision:  goes for regular eye exams  Dental: regular dental visits and brushes teeth twice daily    SUBSTANCE USE  Alcohol consumption: Yes Heavy  Smoking Cig: former smoker (quit 3 years ago)  Vaping: yes  Current use of recreational drugs: no   History of substance use: no     WOMEN'S HEALTH  Follow gynecology: yes  Menstrual cycles:  irregular  Sexually active: No  Do you have any problems with urination? yes nocturia 2 times per night and urge to urinate with little or no warning    FAMILY HISTORY, RISK FACTORS  Do you have any health concerns based on your family history or your risk factors? no    PATIENT AWARENESS   Do you know what medications you are on and why? yes  Do you check your blood pressures at home? yes  Do you try your best to follow-up with your health screenings? yes  Do you track your health metrics? yes    QUALITY OF LIFE   Do you have hobbies you enjoy? yes  Do you have a support system? yes      Review of Systems   Constitutional:  Negative for chills, fever and unexpected weight change.   Respiratory:  Negative for chest tightness and shortness of breath.    Cardiovascular:  Negative for chest pain.   Gastrointestinal:  Negative for abdominal pain, constipation, diarrhea, nausea and vomiting.   Endocrine: Negative for polydipsia and polyuria.   Genitourinary:  Negative for dysuria and hematuria.   Neurological:  Negative for dizziness, weakness, light-headedness and headaches.       ALLERGIES      Allergies   Allergen Reactions   • Fluarix [Influenza Virus Vaccine] Anaphylaxis   • Tuberculin, Ppd Anaphylaxis   • Botox [Botulinum Toxin Type A] Throat Swelling   • Ciprofloxacin Other (See Comments)   • Fluvastatin Throat Swelling       PAST MEDICAL & SURGICAL HISTORY      Past Medical History:   Diagnosis Date   • Personal history of ankylosing spondylitis      Past Surgical History:   Procedure Laterality Date   • MANDIBLE FRACTURE SURGERY     • SINUS SURGERY     • TONSILLECTOMY         FAMILY  "HISTORY & SOCIAL HISTORY      Family History   Problem Relation Age of Onset   • Diabetes Mother    • Hypertension Mother    • Alcohol abuse Father       Social History     Tobacco Use   • Smoking status: Former     Types: Cigarettes     Passive exposure: Past   • Smokeless tobacco: Not on file   Vaping Use   • Vaping status: Every Day   • Substances: Nicotine   Substance and Sexual Activity   • Alcohol use: Yes     Comment: socially   • Drug use: Never   • Sexual activity: Not on file        OBJECTIVES      /86 (BP Location: Right arm, Patient Position: Sitting, Cuff Size: Standard)   Pulse 86   Temp 97.8 °F (36.6 °C) (Tympanic)   Resp 12   Ht 5' 3\" (1.6 m)   Wt 62.7 kg (138 lb 3.2 oz)   SpO2 97%   BMI 24.48 kg/m²    Physical Exam  Vitals reviewed.   Constitutional:       General: She is not in acute distress.     Appearance: Normal appearance. She is not ill-appearing, toxic-appearing or diaphoretic.   HENT:      Head: Normocephalic and atraumatic.      Right Ear: External ear normal.      Left Ear: External ear normal.      Nose: Nose normal.      Mouth/Throat:      Mouth: Mucous membranes are moist.   Eyes:      General: No scleral icterus.        Right eye: No discharge.         Left eye: No discharge.      Extraocular Movements: Extraocular movements intact.      Conjunctiva/sclera: Conjunctivae normal.   Cardiovascular:      Rate and Rhythm: Normal rate and regular rhythm.      Pulses: Normal pulses.      Heart sounds: Normal heart sounds.   Pulmonary:      Effort: Pulmonary effort is normal. No respiratory distress.      Breath sounds: Normal breath sounds.   Abdominal:      Palpations: Abdomen is soft.      Tenderness: There is no abdominal tenderness.   Musculoskeletal:         General: No swelling. Normal range of motion.      Cervical back: Normal range of motion.   Skin:     General: Skin is warm and dry.   Neurological:      General: No focal deficit present.      Mental Status: She is " alert and oriented to person, place, and time.   Psychiatric:         Mood and Affect: Mood normal.         Behavior: Behavior normal.         Thought Content: Thought content normal.           Rocky Yancey MD  Family Medicine Physician   [facility] [place]      Administrative Statements     Medications have been reviewed by provider in current encounter

## 2025-02-19 NOTE — ASSESSMENT & PLAN NOTE
Currently not prescribed any meds.   Reports no concerns.     Assessment: tearful during visit, and reported trauma that has affected her emotionally but did not go into details.   Med changes: none  Will discuss further at next apt.  Return in 3 months for close monitoring.  Complete blood work in the meantime.   Orders:  •  TSH, 3rd generation with Free T4 reflex

## 2025-02-20 ENCOUNTER — TELEPHONE (OUTPATIENT)
Dept: ADMINISTRATIVE | Facility: OTHER | Age: 55
End: 2025-02-20

## 2025-02-20 ENCOUNTER — TELEPHONE (OUTPATIENT)
Age: 55
End: 2025-02-20

## 2025-02-20 NOTE — TELEPHONE ENCOUNTER
PA for lisinopril (ZESTRIL) 10 mg tablet SUBMITTED to     via    []CMM-KEY:   [x]Surescripts-Case ID # [id number]   []Availity-Auth ID # NDC #   []Faxed to plan   []Other website   []Phone call Case ID #     [x]PA sent as URGENT    All office notes, labs and other pertaining documents and studies sent. Clinical questions answered. Awaiting determination from insurance company.     Turnaround time for your insurance to make a decision on your Prior Authorization can take 7-21 business days.

## 2025-02-20 NOTE — TELEPHONE ENCOUNTER
----- Message from Rocky Yancey MD sent at 2/19/2025 12:50 PM EST -----  Regarding: care gap request  02/19/25 12:50 PM    Hello, our patient attached above has had Hepatitis C and HIV completed/performed. Please assist in updating the patient chart by pulling the Care Everywhere (CE) document.    Thank you,  Rocky Yancey MD   [facility] [place]

## 2025-02-20 NOTE — TELEPHONE ENCOUNTER
Upon review of the In Basket request we were able to locate, review, and update the patient chart as requested for Hepatitis C , HIV, and Pap Smear (HPV) aka Cervical Cancer Screening.    Any additional questions or concerns should be emailed to the Practice Liaisons via the appropriate education email address, please do not reply via In Basket.    Thank you  Jess Valenzuela MA   [facility]

## 2025-05-01 LAB
25(OH)D3+25(OH)D2 SERPL-MCNC: 39 NG/ML (ref 30–100)
ALBUMIN SERPL-MCNC: 4.7 G/DL (ref 3.5–5.7)
ALP SERPL-CCNC: 45 U/L (ref 35–120)
ALT SERPL-CCNC: 33 U/L
ANION GAP SERPL CALCULATED.3IONS-SCNC: 11 MMOL/L (ref 3–11)
APO B SERPL-MCNC: 108 MG/DL
AST SERPL-CCNC: 25 U/L
BASOPHILS # BLD AUTO: 0 THOU/CMM (ref 0–0.1)
BASOPHILS NFR BLD AUTO: 1 %
BILIRUB SERPL-MCNC: 0.5 MG/DL (ref 0.2–1)
BUN SERPL-MCNC: 14 MG/DL (ref 7–25)
CALCIUM SERPL-MCNC: 9.4 MG/DL (ref 8.5–10.5)
CHLORIDE SERPL-SCNC: 103 MMOL/L (ref 100–109)
CHOLEST SERPL-MCNC: 223 MG/DL
CHOLEST/HDLC SERPL: 3.4 {RATIO}
CO2 SERPL-SCNC: 24 MMOL/L (ref 21–31)
CREAT SERPL-MCNC: 0.63 MG/DL (ref 0.4–1.1)
CRP SERPL-MCNC: <1 MG/L
CYTOLOGY CMNT CVX/VAG CYTO-IMP: ABNORMAL
DIFFERENTIAL METHOD BLD: NORMAL
EOSINOPHIL # BLD AUTO: 0.2 THOU/CMM (ref 0–0.5)
EOSINOPHIL NFR BLD AUTO: 5 %
ERYTHROCYTE [DISTWIDTH] IN BLOOD BY AUTOMATED COUNT: 14.2 % (ref 12–16)
ERYTHROCYTE [SEDIMENTATION RATE] IN BLOOD BY WESTERGREN METHOD: 12 MM/HR (ref 0–30)
GFR/BSA.PRED SERPLBLD CYS-BASED-ARV: 106 ML/MIN/{1.73_M2}
GLUCOSE SERPL-MCNC: 108 MG/DL (ref 65–99)
HCT VFR BLD AUTO: 37.9 % (ref 35–43)
HDLC SERPL-MCNC: 66 MG/DL (ref 23–92)
HGB BLD-MCNC: 12.8 G/DL (ref 11.5–14.5)
LDLC SERPL CALC-MCNC: 144 MG/DL
LDLC SERPL DIRECT ASSAY-MCNC: 156 MG/DL
LPA SERPL-SCNC: 9 NMOL/L
LYMPHOCYTES # BLD AUTO: 1.5 THOU/CMM (ref 1–3)
LYMPHOCYTES NFR BLD AUTO: 30 %
MCH RBC QN AUTO: 29.2 PG (ref 26–34)
MCHC RBC AUTO-ENTMCNC: 33.7 G/DL (ref 32–37)
MCV RBC AUTO: 87 FL (ref 80–100)
MONOCYTES # BLD AUTO: 0.6 THOU/CMM (ref 0.3–1)
MONOCYTES NFR BLD AUTO: 11 %
NEUTROPHILS # BLD AUTO: 2.7 THOU/CMM (ref 1.8–7.8)
NEUTROPHILS NFR BLD AUTO: 53 %
NONHDLC SERPL-MCNC: 157 MG/DL
PLATELET # BLD AUTO: 273 THOU/CMM (ref 140–350)
PMV BLD REES-ECKER: 8.4 FL (ref 7.5–11.3)
POTASSIUM SERPL-SCNC: 4.1 MMOL/L (ref 3.5–5.2)
PROT SERPL-MCNC: 7.4 G/DL (ref 6.3–8.3)
RBC # BLD AUTO: 4.38 MILL/CMM (ref 3.7–4.7)
SODIUM SERPL-SCNC: 138 MMOL/L (ref 135–145)
TRIGL SERPL-MCNC: 67 MG/DL
TSH SERPL-ACNC: 0.88 UIU/ML (ref 0.45–5.33)
WBC # BLD AUTO: 5.1 THOU/CMM (ref 4–10)

## 2025-05-02 ENCOUNTER — RESULTS FOLLOW-UP (OUTPATIENT)
Age: 55
End: 2025-05-02

## 2025-05-14 DIAGNOSIS — M54.2 CERVICAL PAIN (NECK): ICD-10-CM

## 2025-05-14 RX ORDER — GABAPENTIN 100 MG/1
100 CAPSULE ORAL 3 TIMES DAILY
Qty: 90 CAPSULE | Refills: 0 | Status: SHIPPED | OUTPATIENT
Start: 2025-05-14

## 2025-06-03 ENCOUNTER — OFFICE VISIT (OUTPATIENT)
Dept: OBGYN CLINIC | Facility: CLINIC | Age: 55
End: 2025-06-03
Payer: COMMERCIAL

## 2025-06-03 ENCOUNTER — TELEPHONE (OUTPATIENT)
Age: 55
End: 2025-06-03

## 2025-06-03 DIAGNOSIS — G56.01 CARPAL TUNNEL SYNDROME ON RIGHT: ICD-10-CM

## 2025-06-03 DIAGNOSIS — Z76.0 MEDICATION REFILL: Primary | ICD-10-CM

## 2025-06-03 DIAGNOSIS — M65.4 TENOSYNOVITIS, DE QUERVAIN: Primary | ICD-10-CM

## 2025-06-03 PROCEDURE — 99214 OFFICE O/P EST MOD 30 MIN: CPT | Performed by: STUDENT IN AN ORGANIZED HEALTH CARE EDUCATION/TRAINING PROGRAM

## 2025-06-03 PROCEDURE — 20550 NJX 1 TENDON SHEATH/LIGAMENT: CPT | Performed by: STUDENT IN AN ORGANIZED HEALTH CARE EDUCATION/TRAINING PROGRAM

## 2025-06-03 PROCEDURE — 20526 THER INJECTION CARP TUNNEL: CPT | Performed by: STUDENT IN AN ORGANIZED HEALTH CARE EDUCATION/TRAINING PROGRAM

## 2025-06-03 RX ORDER — LIDOCAINE HYDROCHLORIDE 10 MG/ML
1 INJECTION, SOLUTION INFILTRATION; PERINEURAL
Status: COMPLETED | OUTPATIENT
Start: 2025-06-03 | End: 2025-06-03

## 2025-06-03 RX ORDER — BETAMETHASONE SODIUM PHOSPHATE AND BETAMETHASONE ACETATE 3; 3 MG/ML; MG/ML
6 INJECTION, SUSPENSION INTRA-ARTICULAR; INTRALESIONAL; INTRAMUSCULAR; SOFT TISSUE
Status: COMPLETED | OUTPATIENT
Start: 2025-06-03 | End: 2025-06-03

## 2025-06-03 RX ORDER — LEFLUNOMIDE 20 MG/1
1 TABLET ORAL DAILY
COMMUNITY
Start: 2025-04-29

## 2025-06-03 RX ORDER — RIBOFLAVIN (VITAMIN B2) 100 MG
1 TABLET ORAL 3 TIMES DAILY
COMMUNITY

## 2025-06-03 RX ADMIN — BETAMETHASONE SODIUM PHOSPHATE AND BETAMETHASONE ACETATE 6 MG: 3; 3 INJECTION, SUSPENSION INTRA-ARTICULAR; INTRALESIONAL; INTRAMUSCULAR; SOFT TISSUE at 11:00

## 2025-06-03 RX ADMIN — LIDOCAINE HYDROCHLORIDE 1 ML: 10 INJECTION, SOLUTION INFILTRATION; PERINEURAL at 11:00

## 2025-06-03 NOTE — ASSESSMENT & PLAN NOTE
Discussed continued non operative versus surgical intervention. The patient is not interested in surgical intervention at this time. She consented and underwent a repeat left 1st dorsal steroid injection in the office today without any complications.   Orders:    Hand/upper extremity injection: L extensor compartment 1

## 2025-06-03 NOTE — PROGRESS NOTES
ORTHOPAEDIC HAND, WRIST, AND ELBOW OFFICE  VISIT      ASSESSMENT/PLAN:      Assessment & Plan  Tenosynovitis, de Quervain, left  Discussed continued non operative versus surgical intervention. The patient is not interested in surgical intervention at this time. She consented and underwent a repeat left 1st dorsal steroid injection in the office today without any complications.   Orders:    Hand/upper extremity injection: L extensor compartment 1    Carpal tunnel syndrome on right  The patient was advised to get a cock-up wrist brace at night.  Orders:    Hand/upper extremity injection: R carpal tunnel        Follow Up:  PRN       To Do Next Visit:         Naldo Umaña MD  Attending, Orthopaedic Surgery  Hand, Wrist, and Elbow Surgery  [facility]    ______________________________________________________________________________________________    CHIEF COMPLAINT:  Chief Complaint   Patient presents with    Left Wrist - Pain     Pain near the scaphoid in left wrist.        SUBJECTIVE:  Patient is a 54 y.o. RHD female who presents today for follow up of left wrist de quervain's. The patient underwent a steroid injection at her last visit n 11/26/24 which he states did not provide her with any relief. She states after the injection she developed swelling into the forearm which took a while to improve. She notes constant pain to the radial aspect of her wrist. She also notes swelling over this area. She has tried bracing, OTC medications, heat, and Voltaren Gel. The patient notes numbness and tingling on the right which is worse at night.      Occupation:       I have personally reviewed all the relevant PMH, PSH, SH, FH, Medications and allergies      PAST MEDICAL HISTORY:  Past Medical History[1]    PAST SURGICAL HISTORY:  Past Surgical History[2]    FAMILY HISTORY:  Family History[3]    SOCIAL HISTORY:  Social History[4]    MEDICATIONS:  Current  "Medications[5]    ALLERGIES:  Allergies[6]        REVIEW OF SYSTEMS:  Musculoskeletal:        As noted in HPI.   All other systems reviewed and are negative.    VITALS:  There were no vitals filed for this visit.    LABS:  HgA1c: No results found for: \"HGBA1C\"  BMP:   Lab Results   Component Value Date    CALCIUM 9.4 05/01/2025    CALCIUM 9.4 05/01/2025    K 4.1 05/01/2025    K 4.1 05/01/2025    CO2 24 05/01/2025    CO2 24 05/01/2025     05/01/2025     05/01/2025    BUN 14 05/01/2025    BUN 14 05/01/2025    CREATININE 0.63 05/01/2025    CREATININE 0.63 05/01/2025       _____________________________________________________  PHYSICAL EXAMINATION:  General: Well developed and well nourished, alert & oriented x 3, appears comfortable  Psychiatric: Normal  HEENT: Normocephalic, Atraumatic Trachea Midline, No torticollis  Pulmonary: No audible wheezing or respiratory distress   Abdomen/GI: Non tender, non distended   Cardiovascular: No pitting edema, 2+ radial pulse   Skin: No masses, erythema, lacerations, fluctation, ulcerations  Neurovascular: Sensation Intact to the Median, Ulnar, Radial Nerve, Motor Intact to the Median, Ulnar, Radial Nerve, and Pulses Intact  Musculoskeletal: Normal, except as noted in detailed exam and in HPI.      MUSCULOSKELETAL EXAMINATION:  Left wrist  TTP 1st dorsal compartment  + Finkelstein   Swelling over 1st dorsal compartment  Full fist  Full digit extension     Right hand  - tinel's at the wrist  + Phalen's  - tinel's at the elbow  Full fist  Full digit ext    ___________________________________________________  STUDIES REVIEWED:  No new studies to review         PROCEDURES PERFORMED:  Hand/upper extremity injection: R carpal tunnel    [place] Protocol:  procedure performed by consultantConsent: Verbal consent obtained  Consent given by: patient  Patient identity confirmed: verbally with patient  Supporting Documentation  Indications: pain   Procedure " Details  Condition:carpal tunnel syndrome Site: R carpal tunnel   Needle size: 25 G  Ultrasound guidance: no  Medications administered: 1 mL lidocaine 1 %; 6 mg betamethasone acetate-betamethasone sodium phosphate 6 (3-3) mg/mL  Patient tolerance: patient tolerated the procedure well with no immediate complications  Dressing:  Sterile dressing applied       Hand/upper extremity injection: L extensor compartment 1    Universal Protocol:  procedure performed by consultantConsent: Verbal consent obtained  Consent given by: patient  Patient identity confirmed: verbally with patient  Supporting Documentation  Indications: pain   Procedure Details  Condition:de Quervain's tenosynovitis Site: L extensor compartment 1   Needle size: 25 G  Ultrasound guidance: no  Medications administered: 1 mL lidocaine 1 %; 6 mg betamethasone acetate-betamethasone sodium phosphate 6 (3-3) mg/mL  Patient tolerance: patient tolerated the procedure well with no immediate complications  Dressing:  Sterile dressing applied         -    _____________________________________________________      Scribe Attestation      I,:  Carol Israel MA am acting as a scribe while in the presence of the attending physician.:       I,:  Naldo Umaña MD personally performed the services described in this documentation    as scribed in my presence.:                [1]   Past Medical History:  Diagnosis Date    Personal history of ankylosing spondylitis    [2]   Past Surgical History:  Procedure Laterality Date    MANDIBLE FRACTURE SURGERY      SINUS SURGERY      TONSILLECTOMY     [3]   Family History  Problem Relation Name Age of Onset    Diabetes Mother      Hypertension Mother      Alcohol abuse Father     [4]   Social History  Tobacco Use    Smoking status: Former     Types: Cigarettes     Passive exposure: Past   Vaping Use    Vaping status: Every Day    Substances: Nicotine   Substance Use Topics    Alcohol use: Yes     Comment: socially    Drug  use: Never   [5]   Current Outpatient Medications:     fluticasone (FLONASE) 50 mcg/act nasal spray, , Disp: , Rfl:     gabapentin (NEURONTIN) 100 mg capsule, Take 1 capsule (100 mg total) by mouth 3 (three) times a day, Disp: 90 capsule, Rfl: 0    leflunomide (ARAVA) 20 MG tablet, Take 1 tablet by mouth in the morning, Disp: , Rfl:     lisinopril (ZESTRIL) 10 mg tablet, Take 1 tablet (10 mg total) by mouth daily, Disp: 100 tablet, Rfl: 3    Omega 3 1000 MG CAPS, Take 2 capsules by mouth in the morning, Disp: , Rfl:     glucosamine-chondroitin 500-400 MG tablet, Take 1 tablet by mouth in the morning and 1 tablet at noon and 1 tablet in the evening., Disp: , Rfl:     leflunomide (ARAVA) 10 MG tablet, Take 10 mg by mouth in the morning. (Patient not taking: Reported on 6/3/2025), Disp: , Rfl:     naproxen (Naprosyn) 500 mg tablet, Take 1 tablet (500 mg total) by mouth 2 (two) times a day with meals for 7 days (Patient not taking: Reported on 2/6/2024), Disp: 14 tablet, Rfl: 0  [6]   Allergies  Allergen Reactions    Fluarix [Influenza Virus Vaccine] Anaphylaxis    Tuberculin, Ppd Anaphylaxis    Botox [Botulinum Toxin Type A] Throat Swelling    Ciprofloxacin Other (See Comments)    Fluvastatin Throat Swelling

## 2025-06-03 NOTE — PATIENT INSTRUCTIONS
Thumb spica brace for the left wrist  Cock up wrist brace at night for right wrist   Comfort cool brace left

## 2025-06-04 ENCOUNTER — PROCEDURE VISIT (OUTPATIENT)
Age: 55
End: 2025-06-04
Payer: COMMERCIAL

## 2025-06-04 VITALS — WEIGHT: 138 LBS | HEIGHT: 63 IN | BODY MASS INDEX: 24.45 KG/M2

## 2025-06-04 DIAGNOSIS — S16.1XXA CERVICAL STRAIN, ACUTE, INITIAL ENCOUNTER: Primary | ICD-10-CM

## 2025-06-04 DIAGNOSIS — Z00.6 ENCOUNTER FOR EXAMINATION FOR NORMAL COMPARISON OR CONTROL IN CLINICAL RESEARCH PROGRAM: ICD-10-CM

## 2025-06-04 DIAGNOSIS — M99.03 SEGMENTAL DYSFUNCTION OF LUMBAR REGION: ICD-10-CM

## 2025-06-04 DIAGNOSIS — G44.219 EPISODIC TENSION-TYPE HEADACHE, NOT INTRACTABLE: ICD-10-CM

## 2025-06-04 DIAGNOSIS — M99.02 SEGMENTAL DYSFUNCTION OF THORACIC REGION: ICD-10-CM

## 2025-06-04 PROCEDURE — 98941 CHIROPRACT MANJ 3-4 REGIONS: CPT | Performed by: CHIROPRACTOR

## 2025-06-04 RX ORDER — OMEGA-3 FATTY ACIDS/FISH OIL 300-1000MG
2 CAPSULE ORAL DAILY
Qty: 60 CAPSULE | Refills: 0 | Status: SHIPPED | OUTPATIENT
Start: 2025-06-04 | End: 2025-06-11 | Stop reason: SDUPTHER

## 2025-06-04 NOTE — PROGRESS NOTES
Initial date of service: 2/6/24    Diagnoses and all orders for this visit:    Cervical strain, acute, initial encounter    Segmental dysfunction of thoracic region    Episodic tension-type headache, not intractable    Segmental dysfunction of lumbar region    Mechanical neck/back pain with positional shoulder impingement, exacerbated by carpal tunnel. Pt responded to tx with reduced pain and increased ROM. F/up 1-2 wks    TREATMENT: [id number]  Ther-ex: IASTM - discussed post procedure soreness and/or ecchymosis for up to 36 hrs, applied to affected mm hypertonicities; wall jerel, axial retraction, upper trap stretch, lev scap stretch, SCM stretch, abdominal bracing. Thoracic mobilization/manipulation: prone P-A mob, supine A-P manip; cervical mobilization/manipulation: traction, diversified supine graded mobilization; prone lumbar flexion-traction    HPI:  Luke Garcia is a 54 y.o. female   Chief Complaint   Patient presents with    Neck Pain     Neck and right shoulder about a 5 and down her arm with numbness in her fingers laying down at night      Pt presents for tx for left sided neck and ubp pain since shoveling snow winter 2024. Pt has tried massage and cupping with modest benefit. Pt waits tables and cleans houses. Pt has concurrent tendonitis in L wrist that has responded well to cortisone injection  6/4: Pt reports suffering flare-up of neck/back pain recently with new paresthesias into UE all 5 digits just when she lays down to go to sleep; recently diagnosed with carpal tunnel    Neck Pain   This is a new problem. The current episode started more than 1 month ago. The problem occurs daily. The problem has been waxing and waning. The pain is present in the left side, midline and occipital region. The quality of the pain is described as aching, burning, cramping and stabbing. Pain scale: 1-7/10. The symptoms are aggravated by bending, position, stress and twisting (palliative includes massage). Associated  symptoms include headaches. Associated symptoms comments: suboccipital.   Shoulder Pain       Past Medical History:   Diagnosis Date    Personal history of ankylosing spondylitis       The following portions of the patient's history were reviewed and updated as appropriate: allergies, past family history, past medical history, past social history, past surgical history, and problem list.  Review of Systems   Musculoskeletal:  Positive for neck pain.   Neurological:  Positive for headaches.     Physical Exam  Neck:        Comments: Pnful and limited in [place], Lrot, Llf, Ext/Lrot  Musculoskeletal:      Cervical back: Pain with movement and muscular tenderness present. Decreased range of motion.      Thoracic back: Spasms and tenderness present. Decreased range of motion.        Back:    Lymphadenopathy:      Cervical: No cervical adenopathy.     Neurological:      Mental Status: She is alert and oriented to person, place, and time.      Gait: Gait is intact.     Psychiatric:         Mood and Affect: Mood and affect normal.     SOFT TISSUE ASSESSMENT: Hypertonicity and tenderness palpated L C5-T6, T12/L1 erector spinae, L upper trap, lev scap, SCM, rhomboid, suboccipitals JOINT RECTRICTIONS: C0/1, C5-T6, T12/L1   Return in about 1 week (around 6/11/2025) for Next scheduled follow up.

## 2025-06-11 ENCOUNTER — OFFICE VISIT (OUTPATIENT)
Age: 55
End: 2025-06-11
Payer: COMMERCIAL

## 2025-06-11 VITALS
WEIGHT: 137.2 LBS | HEART RATE: 80 BPM | HEIGHT: 63 IN | TEMPERATURE: 97.8 F | OXYGEN SATURATION: 96 % | SYSTOLIC BLOOD PRESSURE: 106 MMHG | DIASTOLIC BLOOD PRESSURE: 68 MMHG | RESPIRATION RATE: 18 BRPM | BODY MASS INDEX: 24.31 KG/M2

## 2025-06-11 DIAGNOSIS — E55.9 VITAMIN D DEFICIENCY: ICD-10-CM

## 2025-06-11 DIAGNOSIS — E16.2 HYPOGLYCEMIA: ICD-10-CM

## 2025-06-11 DIAGNOSIS — I10 PRIMARY HYPERTENSION: Primary | ICD-10-CM

## 2025-06-11 DIAGNOSIS — E78.5 DYSLIPIDEMIA: ICD-10-CM

## 2025-06-11 DIAGNOSIS — F43.23 ADJUSTMENT REACTION WITH ANXIETY AND DEPRESSION: ICD-10-CM

## 2025-06-11 PROCEDURE — 99214 OFFICE O/P EST MOD 30 MIN: CPT | Performed by: FAMILY MEDICINE

## 2025-06-11 RX ORDER — LISINOPRIL 5 MG/1
5 TABLET ORAL DAILY
Qty: 100 TABLET | Refills: 1 | Status: SHIPPED | OUTPATIENT
Start: 2025-06-11

## 2025-06-11 RX ORDER — BLOOD-GLUCOSE METER
EACH MISCELLANEOUS AS NEEDED
Qty: 1 KIT | Refills: 0 | Status: SHIPPED | OUTPATIENT
Start: 2025-06-11 | End: 2025-06-12

## 2025-06-11 RX ORDER — LANCETS 33 GAUGE
EACH MISCELLANEOUS DAILY
Qty: 100 EACH | Refills: 3 | Status: SHIPPED | OUTPATIENT
Start: 2025-06-11

## 2025-06-11 RX ORDER — OMEGA-3 FATTY ACIDS/FISH OIL 300-1000MG
2 CAPSULE ORAL DAILY
Qty: 60 CAPSULE | Refills: 0 | Status: SHIPPED | OUTPATIENT
Start: 2025-06-11 | End: 2025-07-11

## 2025-06-11 RX ORDER — CHOLECALCIFEROL (VITAMIN D3) 1250 MCG
50000 CAPSULE ORAL 2 TIMES WEEKLY
Qty: 8 CAPSULE | Refills: 0 | Status: SHIPPED | OUTPATIENT
Start: 2025-06-12

## 2025-06-11 NOTE — ASSESSMENT & PLAN NOTE
BP Readings from Last 3 Encounters:   06/11/25 106/68   02/19/25 144/86   01/03/25 160/97      Currently prescribed the following:  Reports taking as prescribed.  Assessment&Plan:  Well controlled. Would benefit from lowering dose  Med changes: Yes, adjust to the following:  Decrease lisinopril to 5 mg daily.  Lifestyle modifications recommended, including reduced sodium intake, regular exercise, maintaining a healthy weight, limiting alcohol consumption, and/or avoiding cig smoking.  Return in 3 months for close monitoring.     Orders:  •  lisinopril (ZESTRIL) 5 mg tablet; Take 1 tablet (5 mg total) by mouth daily

## 2025-06-11 NOTE — ASSESSMENT & PLAN NOTE
05/01/2025 Lipoprotein a measurement: 9.0   05/01/2025 Reviewed & discussed labs today.   Currently not prescribed any meds.   Assessment&Plan: Uncontrolled  Med changes: None. Continue to focus on lifestyle improvement only.   Lifestyle modifications advised  Including improving nutritional intake and exercising regularly, really focusing on building good habits as discussed.  Goal of <60 Apolipoprotein B & LDL, <100 triglycerides, and >60 HDL  Monitor lipid panel in 6 months    Orders:  •  Omega 3 1000 MG CAPS; Take 2 capsules (2,000 mg total) by mouth in the morning

## 2025-06-11 NOTE — ASSESSMENT & PLAN NOTE
Currently not prescribed any meds.   Reports no concerns.     Assessment: Previously tearful during visit, and reported trauma that has affected her emotionally but did not go into details.  Continues to have a lot of stressors as it seems.  Did not go into details again today.   Med changes: none.  Consider saffron, magnesium for nighttime.  If continues to have trouble with sleep will start on trazodone.  Will discuss further at next apt.  Return in 3 months for close monitoring.

## 2025-06-11 NOTE — PROGRESS NOTES
Name: Luke Garcia      : 1970      MRN: [id number]  Encounter Provider: Rocky Yancey MD  Encounter Date: 2025   Encounter department: [facility] [place]  :  Assessment & Plan  Primary hypertension  BP Readings from Last 3 Encounters:   25 106/68   25 144/86   25 160/97      Currently prescribed the following:  Reports taking as prescribed.  Assessment&Plan:  Well controlled. Would benefit from lowering dose  Med changes: Yes, adjust to the following:  Decrease lisinopril to 5 mg daily.  Lifestyle modifications recommended, including reduced sodium intake, regular exercise, maintaining a healthy weight, limiting alcohol consumption, and/or avoiding cig smoking.  Return in 3 months for close monitoring.     Orders:  •  lisinopril (ZESTRIL) 5 mg tablet; Take 1 tablet (5 mg total) by mouth daily     Adjustment reaction with anxiety and depression  Currently not prescribed any meds.   Reports no concerns.     Assessment: Previously tearful during visit, and reported trauma that has affected her emotionally but did not go into details.  Continues to have a lot of stressors as it seems.  Did not go into details again today.   Med changes: none.  Consider saffron, magnesium for nighttime.  If continues to have trouble with sleep will start on trazodone.  Will discuss further at next apt.  Return in 3 months for close monitoring.         Dyslipidemia  2025 Lipoprotein a measurement: 9.0   2025 Reviewed & discussed labs today.   Currently not prescribed any meds.   Assessment&Plan: Uncontrolled  Med changes: None. Continue to focus on lifestyle improvement only.   Lifestyle modifications advised  Including improving nutritional intake and exercising regularly, really focusing on building good habits as discussed.  Goal of <60 Apolipoprotein B & LDL, <100 triglycerides, and >60 HDL  Monitor lipid panel in 6 months    Orders:  •  Omega 3 1000 MG CAPS; Take 2  "capsules (2,000 mg total) by mouth in the morning    Vitamin D deficiency  Will do high-dose boost for 4 weeks only.  Printout provided.  Orders:  •  Cholecalciferol (Vitamin D3) 1.25 MG (30290 UT) CAPS; Take 1 capsule (50,000 Units total) by mouth 2 (two) times a week 2 times a week for 4 weeks ONLY then start maintenance dose from over-the-counter.    Hypoglycemia  Having hypoglycemic episodes.  Recommended monitoring blood sugars at home as needed.  Kit provided.  Orders:  •  OneTouch Delica Lancets 33G MISC; Use daily  •  Blood Glucose Monitoring Suppl (Accu-Chek Belle Plus) w/Device KIT; Use if needed (hypoglycemic symptoms)           History of Present Illness   HPI  Review of Systems    Objective   /68 (BP Location: Left arm, Patient Position: Sitting, Cuff Size: Standard)   Pulse 80   Temp 97.8 °F (36.6 °C) (Tympanic)   Resp 18   Ht 5' 3\" (1.6 m)   Wt 62.2 kg (137 lb 3.2 oz)   SpO2 96%   BMI 24.30 kg/m²      Physical Exam  Vitals reviewed.   Constitutional:       General: She is not in acute distress.     Appearance: Normal appearance. She is not ill-appearing, toxic-appearing or diaphoretic.   HENT:      Head: Normocephalic and atraumatic.      Right Ear: External ear normal.      Left Ear: External ear normal.      Nose: No congestion or rhinorrhea.     Eyes:      General: No scleral icterus.        Right eye: No discharge.         Left eye: No discharge.      Conjunctiva/sclera: Conjunctivae normal.       Cardiovascular:      Rate and Rhythm: Normal rate.   Pulmonary:      Effort: Pulmonary effort is normal. No respiratory distress.     Neurological:      General: No focal deficit present.      Mental Status: She is alert and oriented to person, place, and time.     Psychiatric:      Comments: Tearing up         "

## 2025-06-12 RX ORDER — BLOOD-GLUCOSE METER
EACH MISCELLANEOUS
Qty: 1 KIT | Refills: 0 | Status: SHIPPED | OUTPATIENT
Start: 2025-06-12 | End: 2025-06-17

## 2025-06-16 DIAGNOSIS — E16.2 HYPOGLYCEMIA: ICD-10-CM

## 2025-06-17 RX ORDER — BLOOD-GLUCOSE METER
EACH MISCELLANEOUS
Qty: 1 KIT | Refills: 0 | Status: SHIPPED | OUTPATIENT
Start: 2025-06-17

## 2025-06-30 DIAGNOSIS — E16.2 HYPOGLYCEMIA: Primary | ICD-10-CM

## 2025-07-06 DIAGNOSIS — E55.9 VITAMIN D DEFICIENCY: ICD-10-CM

## 2025-07-08 RX ORDER — CHOLECALCIFEROL (VITAMIN D3) 1250 MCG
CAPSULE ORAL
Qty: 24 CAPSULE | Refills: 1 | OUTPATIENT
Start: 2025-07-08

## 2025-07-13 DIAGNOSIS — E78.5 DYSLIPIDEMIA: ICD-10-CM

## 2025-07-14 RX ORDER — CHLORAL HYDRATE 500 MG
1000 CAPSULE ORAL 2 TIMES DAILY
Qty: 200 CAPSULE | Refills: 2 | Status: SHIPPED | OUTPATIENT
Start: 2025-07-14

## 2025-08-18 DIAGNOSIS — M54.2 CERVICAL PAIN (NECK): ICD-10-CM

## 2025-08-20 RX ORDER — GABAPENTIN 100 MG/1
100 CAPSULE ORAL 3 TIMES DAILY
Qty: 90 CAPSULE | Refills: 0 | Status: SHIPPED | OUTPATIENT
Start: 2025-08-20